# Patient Record
Sex: MALE | Race: WHITE
[De-identification: names, ages, dates, MRNs, and addresses within clinical notes are randomized per-mention and may not be internally consistent; named-entity substitution may affect disease eponyms.]

---

## 2016-05-20 VITALS
DIASTOLIC BLOOD PRESSURE: 74 MMHG | DIASTOLIC BLOOD PRESSURE: 74 MMHG | DIASTOLIC BLOOD PRESSURE: 74 MMHG | SYSTOLIC BLOOD PRESSURE: 147 MMHG | SYSTOLIC BLOOD PRESSURE: 147 MMHG | SYSTOLIC BLOOD PRESSURE: 147 MMHG

## 2017-03-20 ENCOUNTER — HOSPITAL ENCOUNTER (OUTPATIENT)
Dept: HOSPITAL 61 - SLPLAB | Age: 62
Discharge: HOME | End: 2017-03-20
Attending: INTERNAL MEDICINE
Payer: COMMERCIAL

## 2017-03-20 DIAGNOSIS — G47.33: Primary | ICD-10-CM

## 2017-03-20 PROCEDURE — 95810 POLYSOM 6/> YRS 4/> PARAM: CPT

## 2017-03-23 NOTE — SLEEP
DATE OF STUDY:  03/20/2017



ATTENDING PHYSICIAN:  Dr. Bill Cain.



The patient is a 61-year-old who weighs 230 pounds with a BMI of 32.  The

patient's [place] score was 9.  Sleep study was performed at [facility].  This was a split night study.



During the night study, the patient spent 399 minutes in bed and slept for 316

minutes with a sleep efficiency of 79%.  Sleep latency was 19 minutes with a REM

latency of 284 minutes.  Overall, sleep architecture showed normal stage I

sleep, slightly increased stage II sleep, normal slow wave, and normal REM

sleep.



During the initial diagnostic portion of the study, the patient slept for 158

minutes.  During this time, there was 1 obstructive apnea, 7 mixed and 1 central

apneas.  There were 71 hypopneas.  The patient's apnea-hypopnea index was 30 per

hour.  Supine index was 38 per hour.  REM sleep was not seen during the

diagnostic portion.



EKG monitoring revealed normal sinus rhythm.  Average heart rate was 67 beats

per minute.  No sustained arrhythmias were observed.



Nocturnal oximetry study revealed a mean oxygen saturation of 97% with the

lowest of 88%.



PLMS were seen at index of 4 per hour, and 1 per hour caused EEG arousals.



The patient met the criteria for CPAP initiation.  It was started at 5 cm of

water and titrated up to 7 cm of water.  At the final pressure, the patient had

80 minutes of sleep.  Supine as well as REM sleep was observed.  AHI was reduced

to 3 per hour, and oxygen saturation remained above 90%.  The patient used a

small-sized DreamWear nasal mask.



IMPRESSION:

1.  Severe sleep apnea-hypopnea syndrome with an apnea-hypopnea index of 30 per

hour.

2.  No clinically significant nocturnal hypoxia.

3.  No clinically significant periodic limb movements during sleep.



RECOMMENDATIONS:

1.  CPAP at 7 cm of water completely eliminated the patient's sleep apnea, and

should be used on a nightly basis.

2.  Follow up in 4-6 weeks to assess compliance with CPAP and to document

clinical improvement.

3.  Weight loss is strongly advised.

4.  Avoid CNS depressants.

5.  Caution regarding driving until symptoms of sleep apnea resolve with the use

of CPAP.

 



______________________________

JOY FELDMAN MD



DR:  MATT/arti  JOB#:  [id number] / [id number]

DD:  03/23/2017 15:20  DT:  03/23/2017 16:10



BILL Crump

## 2018-05-03 ENCOUNTER — HOSPITAL ENCOUNTER (OUTPATIENT)
Dept: HOSPITAL 61 - LAB | Age: 63
Discharge: HOME | End: 2018-05-03
Attending: INTERNAL MEDICINE
Payer: COMMERCIAL

## 2018-05-03 DIAGNOSIS — Z00.01: ICD-10-CM

## 2018-05-03 DIAGNOSIS — Z12.5: Primary | ICD-10-CM

## 2018-05-03 DIAGNOSIS — R79.89: ICD-10-CM

## 2018-05-03 LAB
ADD MAN DIFF?: NO
ALBUMIN SERPL-MCNC: 3.9 G/DL (ref 3.4–5)
ALBUMIN/GLOB SERPL: 1.1 {RATIO} (ref 1–1.7)
ALP SERPL-CCNC: 76 U/L (ref 46–116)
ALT (SGPT): 37 U/L (ref 16–63)
ANION GAP SERPL CALC-SCNC: 10 MMOL/L (ref 6–14)
AST SERPL-CCNC: 20 U/L (ref 15–37)
BACTERIA,URINE: 0 /HPF
BASO #: 0 X10^3/UL (ref 0–0.2)
BASO %: 1 % (ref 0–3)
BILIRUBIN,URINE: NEGATIVE
BLOOD UREA NITROGEN: 13 MG/DL (ref 8–26)
BUN/CREAT SERPL: 12 (ref 6–20)
CALCIUM: 8.9 MG/DL (ref 8.5–10.1)
CHLORIDE: 107 MMOL/L (ref 98–107)
CHOLESTEROL/HDL RATIO: 6.8
CHOLESTEROL: 196 MG/DL (ref 0–200)
CLARITY,URINE: CLEAR
CO2 SERPL-SCNC: 26 MMOL/L (ref 21–32)
COLOR,URINE: YELLOW
CREAT SERPL-MCNC: 1.1 MG/DL (ref 0.7–1.3)
EOS #: 0.4 X10^3/UL (ref 0–0.7)
EOS %: 7 % (ref 0–3)
GFR SERPLBLD BASED ON 1.73 SQ M-ARVRAT: 67.6 ML/MIN
GLOBULIN SER-MCNC: 3.7 G/DL (ref 2.2–3.8)
GLUCOSE SERPL-MCNC: 89 MG/DL (ref 70–99)
GLUCOSE,URINE: NEGATIVE MG/DL
HCG SERPL-ACNC: 6 X10^3/UL (ref 4–11)
HDLC: 29 MG/DL (ref 40–60)
HEMATOCRIT: 46.8 % (ref 39–53)
HEMOGLOBIN: 16.1 G/DL (ref 13–17.5)
LDLC: 138 MG/DL (ref 0–100)
LYMPH #: 2.8 X10^3/UL (ref 1–4.8)
LYMPH %: 47 % (ref 24–48)
MEAN CORPUSCULAR HEMOGLOBIN: 31 PG (ref 25–35)
MEAN CORPUSCULAR HGB CONC: 34 G/DL (ref 31–37)
MEAN CORPUSCULAR VOLUME: 90 FL (ref 79–100)
MONO #: 0.6 X10^3/UL (ref 0–1.1)
MONO %: 10 % (ref 0–9)
NEUT #: 2.1 X10^3UL (ref 1.8–7.7)
NEUT %: 35 % (ref 31–73)
NITRITE,URINE: NEGATIVE
NON-HDL CHOLESTEROL: 167 MG/DL (ref 0–129)
PH,URINE: 5.5
PLATELET COUNT: 163 X10^3/UL (ref 140–400)
POTASSIUM SERPL-SCNC: 4.3 MMOL/L (ref 3.5–5.1)
PROSTATE SPECIFIC ANTIGEN: 1.71 NG/ML (ref 0–4)
PROTEIN,URINE: NEGATIVE MG/DL
RBC,URINE: 0 /HPF (ref 0–2)
RED BLOOD COUNT: 5.19 X10^6/UL (ref 4.3–5.7)
RED CELL DISTRIBUTION WIDTH: 14 % (ref 11.5–14.5)
SODIUM: 143 MMOL/L (ref 136–145)
SPECIFIC GRAVITY,URINE: 1.01
SQUAMOUS EPITHELIAL CELL,UR: (no result) /LPF
THYROID STIM HORMONE (TSH): 2.74 UIU/ML (ref 0.36–3.74)
TOTAL BILIRUBIN: 0.4 MG/DL (ref 0.2–1)
TOTAL PROTEIN: 7.6 G/DL (ref 6.4–8.2)
TRIGLYCERIDES: 147 MG/DL (ref 0–150)
UROBILINOGEN,URINE: 0.2 MG/DL
VLDLC: 29 MG/DL (ref 0–40)
WBC,URINE: 0 /HPF (ref 0–4)

## 2018-05-03 PROCEDURE — 84443 ASSAY THYROID STIM HORMONE: CPT

## 2018-05-03 PROCEDURE — 80053 COMPREHEN METABOLIC PANEL: CPT

## 2018-05-03 PROCEDURE — 80061 LIPID PANEL: CPT

## 2018-05-03 PROCEDURE — 85025 COMPLETE CBC W/AUTO DIFF WBC: CPT

## 2018-05-03 PROCEDURE — 81001 URINALYSIS AUTO W/SCOPE: CPT

## 2018-05-03 PROCEDURE — 36415 COLL VENOUS BLD VENIPUNCTURE: CPT

## 2018-05-31 ENCOUNTER — HOSPITAL ENCOUNTER (OUTPATIENT)
Dept: HOSPITAL 61 - PNCL | Age: 63
End: 2018-05-31
Attending: ANESTHESIOLOGY
Payer: COMMERCIAL

## 2018-05-31 DIAGNOSIS — Z86.010: ICD-10-CM

## 2018-05-31 DIAGNOSIS — M50.10: ICD-10-CM

## 2018-05-31 DIAGNOSIS — F17.210: ICD-10-CM

## 2018-05-31 DIAGNOSIS — M51.16: Primary | ICD-10-CM

## 2018-05-31 DIAGNOSIS — Z98.890: ICD-10-CM

## 2018-05-31 DIAGNOSIS — Z87.39: ICD-10-CM

## 2018-05-31 PROCEDURE — 62323 NJX INTERLAMINAR LMBR/SAC: CPT

## 2018-07-18 ENCOUNTER — HOSPITAL ENCOUNTER (OUTPATIENT)
Dept: HOSPITAL 61 - MRI | Age: 63
End: 2018-07-18
Attending: ANESTHESIOLOGY
Payer: COMMERCIAL

## 2018-07-18 DIAGNOSIS — Z90.49: ICD-10-CM

## 2018-07-18 DIAGNOSIS — Z79.899: ICD-10-CM

## 2018-07-18 DIAGNOSIS — M19.90: ICD-10-CM

## 2018-07-18 DIAGNOSIS — Z98.890: ICD-10-CM

## 2018-07-18 DIAGNOSIS — M48.061: ICD-10-CM

## 2018-07-18 DIAGNOSIS — M50.10: ICD-10-CM

## 2018-07-18 DIAGNOSIS — Z86.010: ICD-10-CM

## 2018-07-18 DIAGNOSIS — Z87.891: ICD-10-CM

## 2018-07-18 DIAGNOSIS — G47.30: ICD-10-CM

## 2018-07-18 DIAGNOSIS — M51.26: ICD-10-CM

## 2018-07-18 DIAGNOSIS — M51.16: Primary | ICD-10-CM

## 2018-07-18 PROCEDURE — 72148 MRI LUMBAR SPINE W/O DYE: CPT

## 2018-07-19 ENCOUNTER — HOSPITAL ENCOUNTER (OUTPATIENT)
Dept: HOSPITAL 61 - PNCL | Age: 63
Discharge: HOME | End: 2018-07-19
Attending: ANESTHESIOLOGY
Payer: COMMERCIAL

## 2018-07-19 DIAGNOSIS — M96.1: ICD-10-CM

## 2018-07-19 DIAGNOSIS — M19.90: ICD-10-CM

## 2018-07-19 DIAGNOSIS — Z87.891: ICD-10-CM

## 2018-07-19 DIAGNOSIS — Z86.010: ICD-10-CM

## 2018-07-19 DIAGNOSIS — M51.16: Primary | ICD-10-CM

## 2018-07-19 DIAGNOSIS — Z86.19: ICD-10-CM

## 2018-07-19 DIAGNOSIS — Z98.890: ICD-10-CM

## 2018-07-19 DIAGNOSIS — Z79.899: ICD-10-CM

## 2018-07-19 DIAGNOSIS — M48.061: ICD-10-CM

## 2018-07-19 DIAGNOSIS — Z87.39: ICD-10-CM

## 2018-07-19 DIAGNOSIS — Z90.49: ICD-10-CM

## 2018-07-19 DIAGNOSIS — G47.30: ICD-10-CM

## 2018-07-19 DIAGNOSIS — M50.10: ICD-10-CM

## 2018-07-19 PROCEDURE — 62323 NJX INTERLAMINAR LMBR/SAC: CPT

## 2018-08-16 ENCOUNTER — HOSPITAL ENCOUNTER (OUTPATIENT)
Dept: HOSPITAL 61 - PNCL | Age: 63
Discharge: HOME | End: 2018-08-16
Attending: ANESTHESIOLOGY
Payer: COMMERCIAL

## 2018-08-16 DIAGNOSIS — Z98.890: ICD-10-CM

## 2018-08-16 DIAGNOSIS — Z86.19: ICD-10-CM

## 2018-08-16 DIAGNOSIS — M19.90: ICD-10-CM

## 2018-08-16 DIAGNOSIS — G47.30: ICD-10-CM

## 2018-08-16 DIAGNOSIS — M48.061: ICD-10-CM

## 2018-08-16 DIAGNOSIS — Z90.49: ICD-10-CM

## 2018-08-16 DIAGNOSIS — Z79.899: ICD-10-CM

## 2018-08-16 DIAGNOSIS — M96.1: ICD-10-CM

## 2018-08-16 DIAGNOSIS — Z86.010: ICD-10-CM

## 2018-08-16 DIAGNOSIS — M51.16: Primary | ICD-10-CM

## 2018-08-16 PROCEDURE — 62323 NJX INTERLAMINAR LMBR/SAC: CPT

## 2018-08-16 NOTE — PAIN
DATE OF SERVICE:  08/16/2018



DIAGNOSES:  Lumbar radiculopathy with lumbar degenerative disk disease and

lumbar post-laminectomy syndrome with lumbar herniated disk.



HISTORY OF PRESENT ILLNESS:  The patient is a 62-year-old male who returns for

followup status post lumbar epidural steroid injection x 2, last seen

07/19/2018.  The patient reports he did very well for the last injection about

60% improvement overall in the low back and right leg.  The patient reports

still some pain in the left leg and numbness in the left leg as well, but doing

much better overall.  The patient reports his pain is worse with standing or

standing up straight, but he has been increasing the distance walking, doing

household activity, also playing drums more comfortably.  The patient reports

his pain is 7 on a scale of 10 at its worst, 4 at average, 3 at its least and is

a 4 today.  The patient reports it is sharp, tight, burning, radiating in the

lower extremities, again some numbness in the left leg, but much better on the

right.  The patient reports no new motor or sensory deficits, no new bowel or

bladder incontinence or other complaints.



PHYSICAL EXAMINATION:

VITAL SIGNS:  The patient's blood pressure 151/52, pulse 69, respirations 16,

temperature 98.2 degrees Fahrenheit.  Height is 5 feet 11 inches, weight is 232

pounds.

GENERAL:  The patient is awake, alert, oriented, appropriate, very pleasant

demeanor.

HEENT:  Head shows normocephalic, atraumatic.  Extraocular movements are intact,

symmetrical.  Oral cavity, mucous membranes are moist and pink.  Dentition is

intact.

NECK:  Shows anterior throat supple without palpable lymphadenopathy noted. 

Swallow reflex is symmetrical.

CHEST:  Shows normal on inspection.  Breath sounds clear to auscultation

bilaterally.

HEART:  Shows S1, S2 clear.  No murmurs auscultated.

ABDOMEN:  Soft, nontender, nondistended.  No palpable organomegaly is noted.  No

rebound or guarding demonstrated.

BACK:  Shows spine grossly in the midline with some flattening of lumbar

lordotic curvature with well-healed surgical scar and the lumbar paraspinous

musculature shows symmetrical on inspection with some palpation shows some

moderate tenderness throughout the upper, middle and lower distribution of the

paraspinous muscles, but only diffusely in the paraspinous regions without

radiation.  No tenderness over the spinous processes, sacrum or sacroiliac

regions.  The patient has good rotational motion of lumbar spine, both laterally

greater than 10 degrees right and left as well as extension greater than 10

degrees, forward flexion 45 degrees without significant pain reported.

EXTREMITIES:  Lower extremities show deep tendon reflexes at 2+ in the patellar,

1+ tendo calcaneus tendons.  Motor exam is strong with 5/5 dorsiflexion,

extension, quadriceps and hamstring flexion equal bilaterally.



Options were discussed with the patient.  The patient's old chart was reviewed

as his current medication regimen and updated.  Current review of systems is

updated today as well.  We will proceed with a third in the series of lumbar

epidural steroid injection today with fluoroscopic guidance.  Risks were again

discussed including, but not limited to bleeding, infection, possibility of

epidural hematoma and subsequent neurological compromise, dural puncture,

headaches, spinal cord and/or nerve damage, side effects of steroid medication

and poor results regarding pain control.  The patient understands and wished to

proceed.  The patient will return to the clinic in approximately 2 weeks for

followup, was counseled on return appointment, activity level and side effects

to be aware of.



DIAGNOSES:  Lumbar radiculopathy with lumbar degenerative disk disease,

post-lumbar laminectomy syndrome and herniated lumbar disk.



PROCEDURE:  Lumbar epidural steroid injection, translaminar approach L3-L4 level

using C-arm fluoroscopic guidance under sterile prep and drape using local

anesthetic.



MEDICATION INJECTED:  A total of 120 mg Depo-Medrol plus 10 mL of

preservative-free normal saline and 2 mL of Isovue for contrast.



CONDITION AT DISCHARGE:  Stable.  The patient tolerated procedure well, had no

complications.

 



______________________________

XIANG BARON MD



DR:  KETAN/arti  JOB#:  [id number] / [id number]

DD:  08/16/2018 08:23  DT:  08/16/2018 13:39

## 2018-12-27 ENCOUNTER — HOSPITAL ENCOUNTER (OUTPATIENT)
Dept: HOSPITAL 61 - PNCL | Age: 63
Discharge: HOME | End: 2018-12-27
Attending: ANESTHESIOLOGY
Payer: COMMERCIAL

## 2018-12-27 DIAGNOSIS — M51.16: Primary | ICD-10-CM

## 2018-12-27 DIAGNOSIS — M96.1: ICD-10-CM

## 2018-12-27 PROCEDURE — 62323 NJX INTERLAMINAR LMBR/SAC: CPT

## 2018-12-27 NOTE — PAIN
DATE OF SERVICE:  12/27/2018



PROGRESS NOTE FOR PAIN CLINIC:



DIAGNOSES:  Lumbar radiculopathy with lumbar degenerative disk disease and

lumbar post-laminectomy syndrome and lumbar herniated disk.



HISTORY OF PRESENT ILLNESS:  The patient is a 63-year-old male who returns for

followup, seen 08/16/2018.  The patient had a lumbar epidural steroid injection

at that time with about 60% improvement.  Pain in the low back and right leg now

returning.  For about 2 months, the patient reports in the low back radiating to

the right lower extremity, right posterolateral thigh, anterior thigh, anterior

medial thigh, medial lower leg into the knee as well.  The patient reports it is

across the low back on the right side.  The patient reports it is an 8 on a

scale of 10 at its worst, 5 on average and 3 at its least and is a 5 today,

reports as tingling, sharp, sometimes cold and hot sensations in the leg as

well, certain bending and stooping activities.  The patient reports initially

was increasing his activity walking, doing work activities, household activities

with much greater ease and comfort and traveling with much greater ease and

comfort as well.  The patient reports he still sleeps fairly well at night, but

it can awaken him from sleep, not every night but usually about every 7 hours to

8 hours.  The patient reports no new motor or sensory deficits, no new bowel or

bladder incontinence, but still significant pain, increasing again over the past

month or so in the low back and right leg.



PHYSICAL EXAMINATION:

VITAL SIGNS:  The patient's blood pressure 141/87, pulse 75, respirations are

18, temperature 97.8 degrees Fahrenheit, height is 5 feet 11 inches, weighs 239

pounds.

GENERAL:  The patient is awake, alert, oriented, appropriate, very pleasant

demeanor.

HEENT:  Head is normocephalic, atraumatic.  Extraocular movements are intact and

symmetrical.  Oral cavity, mucous membranes are moist and pink.  Dentition is

intact.

NECK:  Shows anterior throat supple without palpable lymphadenopathy noted. 

Swallow reflex symmetrical.

CHEST:  Shows normal with inspection.  Breath sounds clear to auscultation

bilaterally.

HEART:  Shows S1, S2 clear.  No murmurs auscultated.

ABDOMEN:  Soft, nontender, nondistended.  No palpable organomegaly is noted.  No

rebound or guarding demonstrated.

BACK:  Shows spine grossly in the midline.  Normal appearing thoracic kyphosis

and lumbar lordotic curvature slightly flattened with well-healed surgical

scarring of the lumbar distribution.  Lumbar paraspinous muscle shows

symmetrical on inspection, with palpation shows some moderate tenderness

diffusely throughout the upper, middle and lower distribution of paraspinous

muscles, but only diffusely without radiation.  The patient shows good

rotational motion of lumbar spine with extension, flexion, right and left

lateral rotation without significant increase in pain.

EXTREMITIES:  Lower extremities show deep tendon reflexes at 2+ in the patellar,

1+ tendo-calcaneus tendons are equal.  Motor exam is strong with 5/5

dorsiflexion, extension, quadriceps and hamstring flexion.  Peripheral pulses

are 1+ posterior tibial.  No peripheral edema is noted bilaterally.



Options were discussed with the patient.  The patient's old chart was reviewed

as well as his current medication regimen updated.  Current review of systems

updated today as well.  We will proceed with a first in this series of lumbar

epidural steroid injection today with fluoroscopic guidance.  Risks were again

discussed including, but not limited to bleeding, infection, possibility of

epidural hematoma, subsequent neurologic compromise, dural puncture, headaches,

spinal cord and/or nerve damage, side effects of steroid medication and poor

results regarding pain control.  The patient understands and wished to proceed. 

The patient will return to clinic in approximately 2 weeks for followup, was

counseled as to return appointment, activity level and side effects to be aware

of.



DIAGNOSES:  Lumbar radiculopathy with lumbar degenerative disk disease,

post-laminectomy syndrome and lumbar herniated disk.



PROCEDURE:  Lumbar epidural steroid injection, translaminar approach at L3-L4

level using C-arm fluoroscopic guidance under sterile prep and drape using local

anesthetic.



MEDICATION INJECTED:  A total of 120 mg Depo-Medrol plus 10 mL of

preservative-free normal saline and 2 mL of Isovue for contrast.



CONDITION AT DISCHARGE:  Stable.  The patient tolerated the procedure well, had

no complications.

 



______________________________

XIANG BARON MD



DR:  KETAN/arti  JOB#:  [id number] / [id number]

DD:  12/27/2018 10:26  DT:  12/27/2018 22:39

## 2019-02-18 NOTE — [facility]
Lumbar myelogram February 18, 2019

Lumbar spine radiograph

CT lumbar spine with contrast

 

INDICATION: Lower back pain radiating down bilateral legs with numbness. 

History of prior lumbar surgery.

 

COMPARISON: MRI lumbar spine November 10, 2004

 

TECHNIQUE:

 

5 views of the lumbosacral spine were obtained including flexion and 

extension views.

 

Risks and benefits were discussed with the patient after which informed 

consent was obtained. Patient was placed prone on the examination table. 

Site was marked under fluoroscopic guidance. This site was prepped and 

draped in normal sterile fashion. 1 percent lidocaine was administered for

superficial anesthetic effect. Initially, attempt was made to insert a 

22-gauge 3 1/2 inch spinal needle at the L2-L3 vertebral level utilizing a

left paraspinous approach which was unsuccessful. Subsequently, a 22-gauge

3 1/2 inch spinal needle was inserted successfully into the thecal sac at 

L4-L5 vertebral level. 18 cc Omnipaque 180 was administered into the 

thecal sac. No competitions were identified at that time of procedure. 

Patient tolerated procedure well.

 

Patient was subsequently transported to the CT scanner for further 

imaging. Multiple axial CT images of the lumbar spine were obtained 

without intrathecal contrast. Coronal and sagittal reformats are provided.

 

FINDINGS:

 

Left-sided L4-L5 posterior fusion is identified with unilateral left-sided

facet screws. There is no shift in alignment at L4-L5 upon flexion and 

extension. No significant spondylolisthesis is visualized.

 

Unilateral left-sided fusion is identified at L4-L5. There is no lucency 

surrounding the hardware. Hardware appears intact. There is moderate disc 

height loss at L4-L5. There is mild disc height loss at L5-S1. There is no

significant spondylolisthesis. Vertebral body heights are maintained. No 

acute fracture is identified. Laminectomy changes are identified at L4-L5.

Abdominal aorta is normal in caliber with mild calcified atheromatous 

plaque. No suspicious retroperitoneal lymphadenopathy is identified. 

Visualized portions of the sacrum appear intact. The nerve roots within 

the thecal sac from L2-L3 inferiorly appears thickened with a mild degree 

of clumping which may reflect chronic inflammation as may be seen with 

arachnoiditis. This finding is new from the prior MRI from 2004.

 

L1-L2: There is a mild circumferential disc bulge. There is no significant

facet arthropathy. There is no significant neuroforaminal or spinal canal 

stenosis.

 

L2-L3: There is a large circumferential disc bulge. There is moderate left

and severe right facet arthropathy. There is ligamentum flavum infolding. 

There is moderate bilateral neuroforaminal stenosis. There is severe 

spinal canal stenosis with near complete effacement of the thecal sac. 

Residual thecal sac measures approximately 4 mm in AP dimension. Findings 

are significantly progressed since November 10, 2004.

 

L3-L4: There is a moderate disc bulge with left far lateral disc 

protrusion. There is severe left and moderate right facet arthropathy 

ligamentum flavum infolding. There is moderate right and severe left 

neuroforaminal stenosis. There is left lateral recess stenosis. There is 

moderate spinal canal stenosis. Findings have progressed since the prior 

examination.

 

L4-L5: This level is decompressed partially fused. There is a posterior 

disc osteophyte complex. There is severe left and moderate right 

neuroforaminal stenosis. No spinal canal stenosis.

 

L5-S1: There is mild disc bulge. There is severe facet arthropathy. There 

is severe bilateral neuroforaminal stenosis, progressed since prior 

examination.

 

IMPRESSION:

 

Moderate to advanced degenerative changes of the lumbar spine, as 

described in detail above. Findings have significantly progressed at L2-L3

and L3-L4. Left unilateral posterior fusion at L4-L5 is noted without 

evidence for hardware failure.

 

 

 

Electronically signed by: Lizeth Rodriguez MD (2/18/2019 12:52 PM) 

[facility]

## 2019-02-18 NOTE — [facility]
Lumbar myelogram February 18, 2019

Lumbar spine radiograph

CT lumbar spine with contrast

 

INDICATION: Lower back pain radiating down bilateral legs with numbness. 

History of prior lumbar surgery.

 

COMPARISON: MRI lumbar spine November 10, 2004

 

TECHNIQUE:

 

5 views of the lumbosacral spine were obtained including flexion and 

extension views.

 

Risks and benefits were discussed with the patient after which informed 

consent was obtained. Patient was placed prone on the examination table. 

Site was marked under fluoroscopic guidance. This site was prepped and 

draped in normal sterile fashion. 1 percent lidocaine was administered for

superficial anesthetic effect. Initially, attempt was made to insert a 

22-gauge 3 1/2 inch spinal needle at the L2-L3 vertebral level utilizing a

left paraspinous approach which was unsuccessful. Subsequently, a 22-gauge

3 1/2 inch spinal needle was inserted successfully into the thecal sac at 

L4-L5 vertebral level. 18 cc Omnipaque 180 was administered into the 

thecal sac. No competitions were identified at that time of procedure. 

Patient tolerated procedure well.

 

Patient was subsequently transported to the CT scanner for further 

imaging. Multiple axial CT images of the lumbar spine were obtained 

without intrathecal contrast. Coronal and sagittal reformats are provided.

 

FINDINGS:

 

Left-sided L4-L5 posterior fusion is identified with unilateral left-sided

facet screws. There is no shift in alignment at L4-L5 upon flexion and 

extension. No significant spondylolisthesis is visualized.

 

Unilateral left-sided fusion is identified at L4-L5. There is no lucency 

surrounding the hardware. Hardware appears intact. There is moderate disc 

height loss at L4-L5. There is mild disc height loss at L5-S1. There is no

significant spondylolisthesis. Vertebral body heights are maintained. No 

acute fracture is identified. Laminectomy changes are identified at L4-L5.

Abdominal aorta is normal in caliber with mild calcified atheromatous 

plaque. No suspicious retroperitoneal lymphadenopathy is identified. 

Visualized portions of the sacrum appear intact. The nerve roots within 

the thecal sac from L2-L3 inferiorly appears thickened with a mild degree 

of clumping which may reflect chronic inflammation as may be seen with 

arachnoiditis. This finding is new from the prior MRI from 2004.

 

L1-L2: There is a mild circumferential disc bulge. There is no significant

facet arthropathy. There is no significant neuroforaminal or spinal canal 

stenosis.

 

L2-L3: There is a large circumferential disc bulge. There is moderate left

and severe right facet arthropathy. There is ligamentum flavum infolding. 

There is moderate bilateral neuroforaminal stenosis. There is severe 

spinal canal stenosis with near complete effacement of the thecal sac. 

Residual thecal sac measures approximately 4 mm in AP dimension. Findings 

are significantly progressed since November 10, 2004.

 

L3-L4: There is a moderate disc bulge with left far lateral disc 

protrusion. There is severe left and moderate right facet arthropathy 

ligamentum flavum infolding. There is moderate right and severe left 

neuroforaminal stenosis. There is left lateral recess stenosis. There is 

moderate spinal canal stenosis. Findings have progressed since the prior 

examination.

 

L4-L5: This level is decompressed partially fused. There is a posterior 

disc osteophyte complex. There is severe left and moderate right 

neuroforaminal stenosis. No spinal canal stenosis.

 

L5-S1: There is mild disc bulge. There is severe facet arthropathy. There 

is severe bilateral neuroforaminal stenosis, progressed since prior 

examination.

 

IMPRESSION:

 

Moderate to advanced degenerative changes of the lumbar spine, as 

described in detail above. Findings have significantly progressed at L2-L3

and L3-L4. Left unilateral posterior fusion at L4-L5 is noted without 

evidence for hardware failure.

 

 

 

Electronically signed by: Lizeth Rodriguez MD (2/18/2019 12:52 PM) 

[facility]

## 2019-02-18 NOTE — [facility]
Lumbar myelogram February 18, 2019

Lumbar spine radiograph

CT lumbar spine with contrast

 

INDICATION: Lower back pain radiating down bilateral legs with numbness. 

History of prior lumbar surgery.

 

COMPARISON: MRI lumbar spine November 10, 2004

 

TECHNIQUE:

 

5 views of the lumbosacral spine were obtained including flexion and 

extension views.

 

Risks and benefits were discussed with the patient after which informed 

consent was obtained. Patient was placed prone on the examination table. 

Site was marked under fluoroscopic guidance. This site was prepped and 

draped in normal sterile fashion. 1 percent lidocaine was administered for

superficial anesthetic effect. Initially, attempt was made to insert a 

22-gauge 3 1/2 inch spinal needle at the L2-L3 vertebral level utilizing a

left paraspinous approach which was unsuccessful. Subsequently, a 22-gauge

3 1/2 inch spinal needle was inserted successfully into the thecal sac at 

L4-L5 vertebral level. 18 cc Omnipaque 180 was administered into the 

thecal sac. No competitions were identified at that time of procedure. 

Patient tolerated procedure well.

 

Patient was subsequently transported to the CT scanner for further 

imaging. Multiple axial CT images of the lumbar spine were obtained 

without intrathecal contrast. Coronal and sagittal reformats are provided.

 

FINDINGS:

 

Left-sided L4-L5 posterior fusion is identified with unilateral left-sided

facet screws. There is no shift in alignment at L4-L5 upon flexion and 

extension. No significant spondylolisthesis is visualized.

 

Unilateral left-sided fusion is identified at L4-L5. There is no lucency 

surrounding the hardware. Hardware appears intact. There is moderate disc 

height loss at L4-L5. There is mild disc height loss at L5-S1. There is no

significant spondylolisthesis. Vertebral body heights are maintained. No 

acute fracture is identified. Laminectomy changes are identified at L4-L5.

Abdominal aorta is normal in caliber with mild calcified atheromatous 

plaque. No suspicious retroperitoneal lymphadenopathy is identified. 

Visualized portions of the sacrum appear intact. The nerve roots within 

the thecal sac from L2-L3 inferiorly appears thickened with a mild degree 

of clumping which may reflect chronic inflammation as may be seen with 

arachnoiditis. This finding is new from the prior MRI from 2004.

 

L1-L2: There is a mild circumferential disc bulge. There is no significant

facet arthropathy. There is no significant neuroforaminal or spinal canal 

stenosis.

 

L2-L3: There is a large circumferential disc bulge. There is moderate left

and severe right facet arthropathy. There is ligamentum flavum infolding. 

There is moderate bilateral neuroforaminal stenosis. There is severe 

spinal canal stenosis with near complete effacement of the thecal sac. 

Residual thecal sac measures approximately 4 mm in AP dimension. Findings 

are significantly progressed since November 10, 2004.

 

L3-L4: There is a moderate disc bulge with left far lateral disc 

protrusion. There is severe left and moderate right facet arthropathy 

ligamentum flavum infolding. There is moderate right and severe left 

neuroforaminal stenosis. There is left lateral recess stenosis. There is 

moderate spinal canal stenosis. Findings have progressed since the prior 

examination.

 

L4-L5: This level is decompressed partially fused. There is a posterior 

disc osteophyte complex. There is severe left and moderate right 

neuroforaminal stenosis. No spinal canal stenosis.

 

L5-S1: There is mild disc bulge. There is severe facet arthropathy. There 

is severe bilateral neuroforaminal stenosis, progressed since prior 

examination.

 

IMPRESSION:

 

Moderate to advanced degenerative changes of the lumbar spine, as 

described in detail above. Findings have significantly progressed at L2-L3

and L3-L4. Left unilateral posterior fusion at L4-L5 is noted without 

evidence for hardware failure.

 

 

 

Electronically signed by: Lizeth Rodriguez MD (2/18/2019 12:52 PM) 

[facility]

## 2019-03-19 NOTE — EKG
[facility]

              [address] [place] [address]

Test Date:    2019               Test Time:    14:08:24

Pat Name:     KENNEDI CABRERA                Department:   

Patient ID:   [id number]           Room:          

Gender:       Male                     Technician:   

:          1955               Requested By: BRAYAN CAT

Order Number: [id number]           Reading MD:   Raúl Forbes

                                 Measurements

Intervals                              Axis          

Rate:         79                       P:            31

CA:           176                      QRS:          -7

QRSD:         86                       T:            21

QT:           390                                    

QTc:          448                                    

                           Interpretive Statements

SINUS RHYTHM

INTERPOLATED ATRIAL PREMATURE COMPLEX(ES)

NONSPECIFIC ST-T WAVE CHANGES.

LEFTWARD AXIS





Electronically Signed On 3- 11:27:51 CDT by Raúl Forbes

## 2019-03-19 NOTE — RAD
AP and Lateral Views of the Chest  3/19/2019 1:41 PM

 

Indication: PRE OP, PT TO HAVE LAMINECTOMY 3/26

 

Comparison: Chest radiograph June 4, 2009

 

Findings: There is no focal consolidation or infiltrate identified. The 

cardiomediastinal silhouette is within normal limits. There is no evidence

of pneumothorax or pleural effusion. No acute osseous abnormalities are 

identified.  Postoperative changes to the cervical spine noted.

 

Impression: No evidence of acute cardiopulmonary process. 

 

Electronically signed by: Fareed Eric MD (3/19/2019 3:53 PM) [id number]

## 2019-03-26 NOTE — PDOC
BRIEF OPERATIVE NOTE


Date:  Mar 26, 2019


Pre-Op Diagnosis


lumbar radiculopathy, lumbar stenosis, lumbar spondylosis, back pain


Post-Op Diagnosis


same


Procedure Performed


bilateral laminectomies of L2-3 and L3-4, neuromonitoring


Surgeon


Willam


Assistant


Patt


Anesthesiologist


Prachi


Anesthesia Type:  General


Blood Loss


25mL


Specimens Obtained


decompression


Findings


severe stenosis L2-3 and L3-4, neuromonitoring remained at least baseline 

throughout the procedure and was less adversely sensitive to manipulation upon 

completion of decompression


Complications


none apparent











BRAYAN CAT MD Mar 26, 2019 11:27

## 2019-03-26 NOTE — NUR
Arrived to unit by bed from PACU. Alert and oriented x's 4. No c/o at this time. Ambulated 
to bathroom  with assist x's 1 voided and return to bed. NISSA's and MICHAEL's on bilaterally. 
IVF's intact and infusing. Pedal pulses + bilaterally, warm touch and no c/o numbness. 
Oriented to room and controls. Side rails up x's 2 with call light in reach. Wife at 
bedside. Cont. monitor.

## 2019-03-27 NOTE — NUR
Quentin is doing well. he is ambulating in room. states the pain in his left leg is almost gone 
and numbness in toes is better. has good strength pulses and sensation bilateral lower 
extremities. dressing (reinforced ) is clean dry and intact.  reviewed orally restrictions 
to activities of daily living.

## 2019-03-27 NOTE — PDOC
PROGRESS NOTES


Subjective


Subjective


Reports resolution of leg pain and numbness. Some incisional pain controlled by 

current regimen. Has been ambulating around the unit without problems.





Objective


Objective





Vital Signs








  Date Time  Temp Pulse Resp B/P (MAP) Pulse Ox O2 Delivery O2 Flow Rate FiO2


 


3/27/19 08:25   20     


 


3/27/19 06:33 98.2 70  154/85 (108) 96 Room Air  





 98.2       


 


3/26/19 12:15       8.0 














Intake and Output 


 


 3/27/19





 07:00


 


Intake Total 2090 ml


 


Output Total 25 ml


 


Balance 2065 ml


 


 


 


Intake Oral 1140 ml


 


IV Total 950 ml


 


Output Estimated Blood Loss 25 ml


 


# Voids 5











Physical Exam


Physical Exam


AAOx4, NAD, dressing c/d/i, GUTIERREZ 5/5, sensation intact LT





Assessment


Assessment


POD 1 L2-3, L3-4 bilateral laminectomy


-neurologically intact with significant improvement of preoperative leg symptoms





Plan


Plan of Care


-continue to mobilize with standard post-op restrictions


-d/c home today


-follow-up with NS/Mckenziemp in two weeks





Comment


Review of Relevant


I have reviewed the following items lydia (where applicable) has been applied.


Medications





Current Medications


Bacitracin 19832 unit/Sodium Chloride 1,000 ml @  1,000 mls/hr 1X  ONCE IRR  

Last administered on 3/26/19at 08:30;  Start 3/26/19 at 06:00;  Stop 3/26/19 at 

06:59;  Status DC


Ondansetron HCl (Zofran) 4 mg PRN Q6HRS  PRN IV NAUSEA/VOMITING;  Start 3/26/19 

at 07:00;  Stop 3/26/19 at 16:00;  Status DC


Fentanyl Citrate (Fentanyl 2ml Vial) 25 mcg PRN Q5MIN  PRN IV MILD PAIN;  Start 

3/26/19 at 07:00;  Stop 3/26/19 at 16:00;  Status DC


Fentanyl Citrate (Fentanyl 2ml Vial) 50 mcg PRN Q5MIN  PRN IV MODERATE TO 

SEVERE PAIN Last administered on 3/26/19at 12:54;  Start 3/26/19 at 07:00;  

Stop 3/26/19 at 16:00;  Status DC


Morphine Sulfate (Morphine Sulfate) 1 mg PRN Q10MIN  PRN IV SEVERE PAIN Last 

administered on 3/26/19at 14:19;  Start 3/26/19 at 07:00;  Stop 3/26/19 at 16:00

;  Status DC


Ringer's Solution 1,000 ml @  30 mls/hr Q24H IV  Last administered on 3/26/19at 

07:57;  Start 3/26/19 at 07:00;  Stop 3/26/19 at 18:59;  Status DC


Hydromorphone HCl (Dilaudid) 0.5 mg PRN Q10MIN  PRN IV SEV PAIN, Second choice;

  Start 3/26/19 at 07:00;  Stop 3/26/19 at 16:00;  Status DC


Prochlorperazine Edisylate (Compazine) 5 mg PACU PRN  PRN IV NAUSEA, MRX1;  

Start 3/26/19 at 07:00;  Stop 3/26/19 at 16:00;  Status DC


Cefazolin Sodium/ Dextrose 50 ml @  100 mls/hr 1X PREOP  PRN IV PRIOR TO 

PROCEDURE Last administered on 3/26/19at 08:35;  Start 3/26/19 at 06:00;  Stop 3

/26/19 at 15:47;  Status DC


Gelatin (Gelfoam  Size 100) 1 each STK-MED ONCE .ROUTE ;  Start 3/26/19 at 05:59

;  Stop 3/26/19 at 07:00;  Status DC


Bupivacaine HCl (Sensorcaine Mpf 0.5%) 30 ml STK-MED ONCE .ROUTE  Last 

administered on 3/26/19at 09:56;  Start 3/26/19 at 05:59;  Stop 3/26/19 at 07:00

;  Status DC


Lidocaine/ Epinephrine (LIDOCAINE 1%-EPI 1:100,000 Multi-Dose) 20 ml STK-MED 

ONCE .ROUTE  Last administered on 3/26/19at 09:25;  Start 3/26/19 at 06:00;  

Stop 3/26/19 at 07:00;  Status DC


Thrombin 20,000 unit STK-MED ONCE TP ;  Start 3/26/19 at 06:00;  Stop 3/26/19 

at 07:00;  Status DC


Propofol 20 ml @ As Directed STK-MED ONCE IV ;  Start 3/26/19 at 07:39;  Stop 3/

26/19 at 07:40;  Status DC


Dexamethasone Sodium Phosphate (Decadron) 20 mg STK-MED ONCE .ROUTE ;  Start 3/

26/19 at 07:39;  Stop 3/26/19 at 07:40;  Status DC


Lidocaine HCl (Lidocaine Pf 2% Vial) 5 ml STK-MED ONCE .ROUTE ;  Start 3/26/19 

at 07:39;  Stop 3/26/19 at 07:40;  Status DC


Ondansetron HCl (Zofran) 4 mg STK-MED ONCE .ROUTE ;  Start 3/26/19 at 07:39;  

Stop 3/26/19 at 07:40;  Status DC


Propofol 0 ml @ As Directed STK-MED ONCE IV ;  Start 3/26/19 at 07:39;  Stop 3/

26/19 at 07:40;  Status DC


Rocuronium Bromide (Zemuron) 50 mg STK-MED ONCE .ROUTE ;  Start 3/26/19 at 07:39

;  Stop 3/26/19 at 07:40;  Status DC


Remifentanil HCl (Ultiva) 2 mg STK-MED ONCE IV ;  Start 3/26/19 at 07:40;  Stop 

3/26/19 at 07:41;  Status DC


Midazolam HCl (Versed) 2 mg STK-MED ONCE .ROUTE ;  Start 3/26/19 at 07:40;  

Stop 3/26/19 at 07:41;  Status DC


Ringer's Solution 1,000 ml @  75 mls/hr [id number] IV ;  Start 3/26/19 at 08:00


Propofol 100 ml @  As Directed STK-MED ONCE IV ;  Start 3/26/19 at 07:12;  Stop 

3/26/19 at 08:13;  Status DC


Ephedrine Sulfate (Akovaz) 50 mg STK-MED ONCE .ROUTE ;  Start 3/26/19 at 09:17;

  Stop 3/26/19 at 09:18;  Status DC


Glycopyrrolate (Robinul) 1 mg STK-MED ONCE .ROUTE ;  Start 3/26/19 at 09:17;  

Stop 3/26/19 at 09:18;  Status DC


Phenylephrine HCl (Raymond-Synephrine Inj) 10 mg STK-MED ONCE .ROUTE ;  Start 3/26/

19 at 09:18;  Stop 3/26/19 at 09:19;  Status DC


Desflurane (Suprane) 90 ml STK-MED ONCE IH ;  Start 3/26/19 at 11:13;  Stop 3/26

/19 at 11:14;  Status DC


Neostigmine Methylsulfate (Neostigmine Methylsulfate) 5 mg STK-MED ONCE .ROUTE 

;  Start 3/26/19 at 11:13;  Stop 3/26/19 at 11:14;  Status DC


Fentanyl Citrate (Fentanyl 2ml Vial) 100 mcg STK-MED ONCE .ROUTE ;  Start 3/26/

19 at 11:17;  Stop 3/26/19 at 11:18;  Status DC


Amitriptyline HCl (Elavil) 25 mg QHS PO  Last administered on 3/26/19at 21:17;  

Start 3/26/19 at 21:00


Vitamin D (Vitamin D3) 5,000 unit DAILY PO ;  Start 3/26/19 at 13:00


Gabapentin (Neurontin) 800 mg TID PO  Last administered on 3/27/19at 08:23;  

Start 3/26/19 at 14:00


Non-Formulary Medication (Melatonin ) 10 mg HS PO ;  Start 3/26/19 at 21:00;  

Status UNV


Sumatriptan Succinate (Imitrex) 100 mg PRN Q2HR  PRN PO MIGRAINE HEADACHE;  

Start 3/26/19 at 12:15


Multivitamins (Thera M Plus) 1 tab DAILY PO ;  Start 3/26/19 at 13:00


Calcium/Vitamin D (Oscal D 500mg/ 200uts) 1 tab BIDWMEALS PO  Last administered 

on 3/27/19at 08:00;  Start 3/26/19 at 17:00


Ferrous Sulfate (Feosol) 325 mg BIDWMEALS PO  Last administered on 3/26/19at 16:

54;  Start 3/26/19 at 17:00


Oxycodone HCl (Roxicodone) 5 mg PRN Q3HRS  PRN PO BREAKTHROUGH PAIN Last 

administered on 3/26/19at 21:31;  Start 3/26/19 at 11:45


Acetaminophen (Tylenol) 650 mg PRN Q6HRS  PRN PO MILD PAIN / TEMP;  Start 3/26/

19 at 11:45


Al Hydroxide/Mg Hydroxide (Mylanta Plus Xs) 30 ml PRN Q3HRS  PRN PO HEARTBURN / 

GAS;  Start 3/26/19 at 11:45


Calcium Carbonate/ Glycine (Tums) 500 mg PRN Q3HRS  PRN PO INDIGESTION;  Start 3

/26/19 at 11:45


Diphenhydramine HCl (Benadryl) 25 mg PRN Q6HRS  PRN PO ITCHING;  Start 3/26/19 

at 11:45


Diphenhydramine HCl (Benadryl) 25 mg PRN Q6HRS  PRN IV ITCHING;  Start 3/26/19 

at 11:45


Zolpidem Tartrate (Ambien) 5 mg PRN QHS  PRN PO INSOMNIA, MAY REPEAT IN 1HR;  

Start 3/26/19 at 11:45


Naloxone HCl (Narcan) 0.1 mg PRN Q2MIN  PRN IV ADMIN;  Start 3/26/19 at 11:45


Sodium Chloride (Normal Saline Flush) 3 ml QSHIFT  PRN IV AFTER MEDS AND BLOOD 

DRAWS;  Start 3/26/19 at 11:45


Oxycodone/ Acetaminophen (Percocet 5/325) 1 tab PRN Q4HRS  PRN PO MILD PAIN, 

1ST CHOICE Last administered on 3/26/19at 18:13;  Start 3/26/19 at 11:45


Oxycodone/ Acetaminophen (Percocet 5/325) 2 tab PRN Q4HRS  PRN PO MODERATE PAIN

, SEVERE PAIN Last administered on 3/27/19at 08:25;  Start 3/26/19 at 11:45


Methocarbamol (Robaxin) 750 mg TID PO  Last administered on 3/27/19at 08:22;  

Start 3/26/19 at 14:00


Senna/Docusate Sodium (Senna Plus) 1 tab BID PO  Last administered on 3/27/19at 

08:22;  Start 3/26/19 at 21:00


Docusate Sodium (Colace) 100 mg BID PO  Last administered on 3/27/19at 08:22;  

Start 3/26/19 at 21:00


Magnesium Hydroxide (Milk Of Magnesia) 2,400 mg PRN Q12HR  PRN PO CONSTIPATION;

  Start 3/26/19 at 11:45


Ondansetron HCl (Zofran) 4 mg PRN Q6HRS  PRN IV NAUESA, 1ST CHOICE;  Start 3/26/

19 at 11:45


Fentanyl Citrate (Fentanyl 2ml Vial) 50 mcg PRN Q1HR  PRN IV SEVERE PAIN;  

Start 3/26/19 at 11:45


Fentanyl Citrate (Fentanyl 2ml Vial) 25 mcg PRN Q1HR  PRN IV SEVERE PAIN;  

Start 3/26/19 at 11:45


Fentanyl Citrate (Fentanyl 2ml Vial) 100 mcg STK-MED ONCE .ROUTE ;  Start 3/26/

19 at 12:09;  Stop 3/26/19 at 12:10;  Status DC


Fentanyl Citrate (Fentanyl 2ml Vial) 100 mcg STK-MED ONCE .ROUTE ;  Start 3/26/

19 at 12:39;  Stop 3/26/19 at 12:40;  Status DC


Morphine Sulfate (Morphine Sulfate) 2 mg STK-MED ONCE .ROUTE ;  Start 3/26/19 

at 12:56;  Stop 3/26/19 at 12:57;  Status DC


Morphine Sulfate (Morphine Sulfate) 2 mg STK-MED ONCE .ROUTE ;  Start 3/26/19 

at 13:21;  Stop 3/26/19 at 13:22;  Status DC





Active Scripts


Active


Reported


Maxalt (Rizatriptan Benzoate) 10 Mg Tablet 10 Mg PO PRN PRN


Acetaminophen 500 Mg Tablet 2 Tab PO BID


Amitriptyline Hcl 25 Mg Tablet 1 Tab PO QHS


Melatonin 3 Mg Tablet 10 Mg PO HS


Vitamin D3 (Cholecalciferol (Vitamin D3)) 5,000 Unit Tablet 1 Tab PO DAILY


Loratadine 10 Mg Tablet 1 Tab PO DAILY


Celebrex (Celecoxib) 200 Mg Capsule 200 Mg PO BID 30 Days


Gabapentin 800 Mg Tablet 800 Mg PO TID


Vitals/I & O





Vital Sign - Last 24 Hours








 3/26/19 3/26/19 3/26/19 3/26/19





 11:48 12:00 12:03 12:15


 


Temp 97  97.0 





 97.0  97.0 


 


Pulse 86  76 


 


Resp 17  17 17


 


B/P (MAP) 164/67  171/85 


 


Pulse Ox 100  100 100


 


O2 Delivery Room Air Mask Simple Mask Simple Mask





 Simple Mask   


 


O2 Flow Rate 8 8 8 8.0


 


    





    





 3/26/19 3/26/19 3/26/19 3/26/19





 12:18 12:29 12:33 12:46


 


Temp 97.0  97.0 





 97.0  97.0 


 


Pulse 79  76 


 


Resp 18 18 18 18


 


B/P (MAP) 147/89  151/69 


 


Pulse Ox 98 98 98 98


 


O2 Delivery Room Air Simple Mask Room Air Room Air


 


    





    





 3/26/19 3/26/19 3/26/19 3/26/19





 12:48 12:54 13:01 13:03


 


Temp 97.0   97.0





 97.0   97.0


 


Pulse 84   78


 


Resp 18 18 18 18


 


B/P (MAP) 154/70   120/97


 


Pulse Ox 98 98 98 99


 


O2 Delivery Room Air Room Air Room Air Room Air


 


    





    





 3/26/19 3/26/19 3/26/19 3/26/19





 13:13 13:18 13:24 13:25


 


Temp  97.0  





  97.0  


 


Pulse  80  


 


Resp 20 19 20 20


 


B/P (MAP)  148/72  


 


Pulse Ox 98 98 98 99


 


O2 Delivery Room Air Room Air Room Air Room Air


 


    





    





 3/26/19 3/26/19 3/26/19 3/26/19





 13:33 13:48 14:03 14:19


 


Temp 97.0 97.0 97.9 





 97.0 97.0 97.9 


 


Pulse 74 74 72 


 


Resp 20 20 20 20


 


B/P (MAP) 126/72 126/72 131/72 


 


Pulse Ox 98 100 99 99


 


O2 Delivery Room Air Room Air Room Air Room Air


 


    





    





 3/26/19 3/26/19 3/26/19 3/26/19





 14:25 14:30 14:30 14:45


 


Temp   97.7 





   97.7 


 


Pulse   87 92


 


Resp   16 16


 


B/P (MAP)   156/96 (116) 140/102 (115)


 


Pulse Ox   96 97


 


O2 Delivery Room Air Room Air Room Air Room Air


 


    





    





 3/26/19 3/26/19 3/26/19 3/26/19





 15:00 15:15 15:45 18:13


 


Pulse 81 84 76 


 


B/P (MAP) 151/94 (113) 159/92 (114) 142/82 (102) 


 


Pulse Ox 97 97 98 


 


O2 Delivery  Room Air Room Air Room Air





 3/26/19 3/26/19 3/26/19 3/26/19





 19:05 19:30 19:30 21:31


 


Temp  98.5  





  98.5  


 


Pulse  79  


 


Resp  18  18


 


B/P (MAP)  133/89 (104)  


 


Pulse Ox 98 94  94


 


O2 Delivery Room Air Room Air Room Air Room Air


 


    





    





 3/26/19 3/26/19 3/26/19 3/27/19





 21:32 22:35 22:35 03:20


 


Temp 98.5   98.1





 98.5   98.1


 


Pulse 78   63


 


Resp 18   


 


B/P (MAP) 151/87 (108)   125/70 (88)


 


Pulse Ox 96   96


 


O2 Delivery Room Air Room Air Room Air BiPAP/CPAP


 


    





    





 3/27/19 3/27/19  





 06:33 08:25  


 


Temp 98.2   





 98.2   


 


Pulse 70   


 


Resp 18 20  


 


B/P (MAP) 154/85 (108)   


 


Pulse Ox 96   


 


O2 Delivery Room Air   














Intake and Output   


 


 3/26/19 3/26/19 3/27/19





 15:00 23:00 07:00


 


Intake Total 1150 ml 540 ml 400 ml


 


Output Total 25 ml  


 


Balance 1125 ml 540 ml 400 ml

















BRAYAN CAT MD Mar 27, 2019 09:51

## 2019-03-27 NOTE — NUR
Patient has had a restful night. No complaints of pain verbalized.  Patient has rested 
quietly this shift. Vitals remain stable. Call light in reach.

## 2019-03-27 NOTE — NUR
reviewed with wife and Quentin discharge instructions regarding restrictions to activities of 
daily living such as bathing, lifting restriction, ambulation, follow up and incisional 
care. reviewed medications especially the new meds and side effects. scripts and dressing 
given to wife. demonstrated dressing change. questions answered. saline lock removed

## 2019-03-27 NOTE — OP
DATE OF SURGERY:  03/26/2019



SURGEON:  Roman Cat MD



ASSISTANT:  Patt.



PREOPERATIVE DIAGNOSES:  Lumbar stenosis, lumbar radiculopathy, lumbar

spondylosis back pain.



POSTOPERATIVE DIAGNOSES:  Lumbar stenosis, lumbar radiculopathy, lumbar

spondylosis, back pain.



PROCEDURE:  Bilateral laminectomies at lumbar 2-3 and lumbar 3-4 with

intraoperative use of neuromonitoring.



ANESTHESIA:  General.



COMPLICATIONS:  None intraprocedurally.



INDICATIONS FOR THE PROCEDURE:  The patient is a 63-year-old gentleman who

presents with a significant lumbar radiculopathy related to degenerative lumbar

stenosis, which has been refractory to conservative management.  Please refer to

the patient's chart for additional detail.



DESCRIPTION OF PROCEDURE:  After informed consent was obtained, the patient was

brought to the operating room.  He was placed under general anesthesia.  He was

placed in the prone position on the Juan table.  All pressure points were

checked and padded appropriately.  Neuromonitoring was instituted and baseline

potentials were obtained.  An appropriate incision location was localized with

fluoroscopy and the patient had a previous lumbar incision from a prior lumbar

surgery just caudal to the current planned surgery.  This was extended, centered

over the region of lumbar 3 after the region was prepped and draped in the usual

sterile fashion.  Monopolar electrocautery was utilized after the skin incision

was made to approach the spinous processes of lumbar 2 and 3.  The spinous

process of lumbar 4 had been previously removed from a prior surgery. 

Dissection was carried out bilaterally across the lamina at these locations. 

Level was again verified with fluoroscopy prior to the initiation of

decompression and bilateral laminectomy was performed across the entirety of

lumbar 3, the inferior aspect of lumbar 2, and the superior aspect of lumbar 4. 

This was performed with the Leksell as well as a Kerrison rongeur and a

pneumatic drill.  Significantly hypertrophied ligament was encountered

underneath the lamina at all of these locations.  This was gently dissected free

with a Lindsborg and removed with a Kerrison rongeur.  The thecal sac was noted to

be very compressed upon the initiation of decompression. It was well

decompressed at all locations upon the completion of the bilateral

laminectomies.  The ligamentous material in the lateral recesses at all

locations bilaterally at lumbar 2-3 and 3-4 were removed with a Kerrison rongeur
,

as well.  The thecal sac was noted to be very well decompressed upon 
completion.  This was verified with direct visualization as well as gentle

palpation with a Jaycob.  Neuromonitoring potentials were also noted to be at

least baseline throughout the entire procedure and were much less noxiously 
sensitive

to tactile stimuli upon completion of decompression.  Pristine hemostasis was

achieved with FloSeal, cottonoids, irrigation and some use of bipolar

electrocautery.  The wound was generously irrigated with antibiotic irrigation

prior to the final closure.  The muscle and fascia were then reapproximated with

0 Vicryl in a simple interrupted fashion.  Subcutaneous tissues reapproximated

with 2-0 Vicryl in interrupted inverted fashion.  The skin was reapproximated

with 4-0 Vicryl in a running subcuticular fashion.  Mastisol and Steri-Strips

were applied and the wound was dressed with Telfa and Tegaderm.  At the end of

procedure, all needle and sponge counts were correct.  Ancef was utilized as a

prophylactic antibiotic prior to the initiation of the procedure.  The patient

was subsequently extubated in the operating room and taken to recovery in stable

condition.  There were no intraprocedural complications apparent.

 



______________________________

ROMAN CAT MD



DR:  [email or web address]/arti  JOB#:  [id number] / [id number]

DD:  03/27/2019 10:36  DT:  03/27/2019 12:15

RIVER

## 2019-03-28 NOTE — PATHOLOGY
[facility] Accession Number: [id number]

.                                                                01

Material submitted:                                        .

DECOMPRESSION, LUMBAR

.                                                                01

Clinical history:                                          .

Lumbar stenosis

.                                                                02

**********************************************************************

Diagnosis:

Segments of fibrocartilaginous, adipose, and skeletal muscle tissue and

bone, lumbar decompression:

- Degenerative changes of fibrocartilaginous tissue.

.

(JPM:mml; 03/28/2019)

[facility]/03/28/2019

**********************************************************************

.                                                                02

Comment:

There is no evidence of an acute inflammatory process or malignancy.

.

(JPM:mml; 03/28/2019)

.                                                                02

Electronically signed:                                     .

Robert Cm MD, Pathologist

NPI- [id number]

.                                                                01

Gross description:                                         .

The specimen is received in formalin, labeled "Alexandr, Quentin, decompression"

and additionally labeled on the requisition as, "lumbar decompression"

and consists of multiple segments of fibrous pink-tan tissue and bone

measuring 9.9 x 8.0 x 1.4 cm in aggregate.  Representative tissue is

submitted in A1 following decalcification.

(SDY; 3/26/2019)

SYU/SYU

.                                                                02

Pathologist provided ICD-10:

M51.36

.                                                                02

CPT                                                        .

734374, [id number]

Specimen Comment: A courtesy copy of this report has been sent to

Specimen Comment: [phone], [phone].

Specimen Comment: Report sent to  / DR ALVAREZ

Specimen Comment: A duplicate report has been generated due to demographic updates.

***Performed at:  01

   [facility]

   [address], [place], [place]  [id number]

   MD Mele Valadez MD Phone:  [phone]

***Performed at:  02

   [facility]

   [address], [place]  [id number]

   MD Robetr Cm MD Phone:  [phone]

## 2019-06-28 NOTE — RAD
MRI of the lumbar spine without and with contrast 6/28/2019

 

CLINICAL HISTORY: Low back pain with history of previous lumbar spine 

surgeries.

 

TECHNIQUE: Unenhanced T1-weighted and T2-weighted sagittal and axial 

inversion parasagittal images the lumbar spine were obtained. After the 

intravenous administration of 21 cc of Dotarem, enhanced T1-weighted 

sagittal and axial images of the lumbar spine were obtained.

 

FINDINGS: Comparison is made to patient's MRI of the lumbar spine dated 

7/18/2018. Additional comparison is made to patient's lumbar myelogram 

dated 2/18/2019.

 

Very mild S-shaped curvature of the thoracolumbar spine is seen. The 

patient is post left posterior lateral fusion at L4-5 using pedicle screws

and stabilizing arthur and bone graft material. The patient is post 

laminectomy at L2-3 and L3-4. This is new since the previous examinations.

Degenerative signal changes are seen involving all of the disks of the 

lumbar spine. Degenerative signal changes are seen within the marrow 

surrounding these discs. Loss of height of the L4-5 disc is noted. The 

conus medullaris is within normal limits in morphology, position, and 

signal characteristics.

 

At the L1-2 disc space there is a mild generalized disc bulge. 

Degenerative changes are seen involving the facet joints bilaterally. 

These findings do not result in significant central spinal canal or neural

foraminal stenosis.

 

At the L2-3 disc space there is a mild to moderate generalized disc bulge.

This is eccentric to the right. Superimposed on the disc bulge is a 

central/right paracentral focal disc protrusion. This measures 3 mm in AP 

diameter. Degenerative changes are seen involving the facet joints 

bilaterally. There are small facet joint effusions bilaterally. The severe

central spinal canal stenosis seen on the previous examination has been 

decompressed within the laminectomy. No neural foraminal stenosis is seen.

 

At the L3-4 disc space there is a mild generalized disc bulge. 

Degenerative changes are seen involving the facet joints, left greater 

than right. The severe central spinal canal stenosis seen on the previous 

examination has been decompressed by the laminectomy. Mild to moderate 

left greater than right neural foraminal stenosis is seen.

 

At the L4-5 level degenerative changes are seen involving the facet joints

bilaterally. No significant central spinal canal stenosis is seen. Mild 

bilateral neural foraminal stenosis is noted.

 

At the L5-S1 disc space there is a mild generalized disc bulge. 

Degenerative changes are seen involving the facet joints bilaterally. 

These findings do not result in significant central spinal canal stenosis.

Mild to moderate bilateral neural foraminal stenosis is seen.

 

IMPRESSION:

1. Post left posterolateral fusion at L4-5. Post laminectomy at L2-3 and 

L3-4. The severe central spinal canal stenosis has been decompressed by 

the laminectomy at these levels since the previous study.

2. The changes of degenerative disc disease are seen throughout the lumbar

spine. These findings do not result in significant central spinal canal 

stenosis. Mild to moderate left greater than right neural foraminal 

stenosis is seen at L3-4. Mild to moderate bilateral neural foraminal 

stenosis is seen at L4-5. Mild bilateral neural foraminal stenosis is seen

at L4-5.

 

Electronically signed by: Horacio Hightower MD (6/28/2019 5:08 PM) [facility]

## 2019-08-15 NOTE — RAD
MRI Lumbar Spine without and with contrast

 

History: Continued low back pain, history of fusion

 

Technique: Multiplanar, multi sequential pre and postcontrast MR imaging 

was performed of the lumbar spine.

 

Comparison: June 28, 2019

 

Findings: 

There is again left posterolateral fusion hardware with left pedicle 

screws at L4 and L5. Lumbar vertebral body stature is unchanged. AP 

alignment is unchanged. There is again severe narrowing of the L4-5 

intervertebral disc space, mild degenerative disc disease L5-S1, L3-4, 

L2-3, and mild disc desiccation L1-2. Conus terminates near L1. There is 

no nodular enhancement of the conus or cauda equina, no significant 

enhancement or fluid in the intervertebral disc spaces. There is mild 

edema of the posterior, inferior L3 endplate as seen previously. 

 

L1-L2:  Spinal canal and neural foramina are adequate.

 

L2-L3:  There again has been posterior decompression. There is again facet

hypertrophic change. There is again mild anterior epidural signal coursing

slightly below the intervertebral disc space more centrally possibly 

component of tiny extrusion although some degree of mild enhancement, 

overall similar in appearance. There is a similar degree of minimal 

narrowing of the far lateral recesses greater on the right. There is 

minimal inferior narrowing of the right neural foramen, left neural 

foramen overall adequate.

 

L3-L4:  There again has been posterior decompression centrally. There is 

again facet degenerative change. There is mild peripheral dural 

enhancement as seen previously, also enhancement posterior at site of 

posterior decompression. There is a similar degree of mild narrowing of 

the far lateral recesses greater on the left. There is again mild inferior

narrowing of the right neural foramen by disc osteophyte complex. There is

again moderate to severe narrowing somewhat greater distally of the left 

neural foramen by disc osteophyte complex and facet with contact exiting 

left L3 nerve root extending to proximal extraforaminal region.

 

L4-L5:  There again has been posterior decompression. There is facet 

degenerative change. There is mild narrowing of the right neural foramen. 

Osteophytes contribute to moderate to severe narrowing left neural foramen

from posteriorly and to lesser degree inferiorly as seen previously.

 

L5-S1:  There is facet degenerative change and mild buckling of the 

ligamentum flavum. Spinal canal is overall adequate. There is again fairly

severe narrowing of the right neural foramen by disc osteophyte 

complex/osteophytes and facet degenerative change with contact exiting 

right L5 nerve root, also at least moderate narrowing of the left neural 

foramen by osteophytes although somewhat poorly characterized.

 

Impression: 

 

1.  Findings are similar compared with previous June 28, 2019 exam. There 

is again left posterolateral fusion hardware L4-5, multilevel posterior 

decompression L2-3 through L4-5. There is a similar degree of mild 

narrowing of the far lateral recesses most notable on the left at L3-4 and

right greater than left at L2-3. There is neural foramina compromise as 

stated, more significant narrowing right greater than left at L5-S1 and on

the left at L4-5 and L3-4.

 

Electronically signed by: Pool Steinberg MD (8/15/2019 11:17 AM) 

[facility]

## 2019-12-24 NOTE — PAIN
DATE OF SERVICE:  12/23/2019



PROGRESS NOTE FOR PAIN CLINIC



DIAGNOSES:  Lumbar radiculopathy with lumbar degenerative disk disease and

lumbar post-laminectomy syndrome.



HISTORY OF PRESENT ILLNESS:  The patient is a 63-year-old male who returns for

followup, last seen about 1 year ago, 12/27/2018.  The patient had lumbar

epidural steroid injection at that time, did very well, subsequently had surgery

in March of this year with decompressive surgery at L2-L3 and L3-L4 levels.  The

patient reports he did very well for several months after the surgery, but the

pain is returning now, but different in the low back into the left posterior

gluteus, posterolateral thigh and posterior calf on the left side only.  The

patient reports it is much lower than the pain as he had previously and more

into the leg.  The patient reports that it is 10 on a scale of 10 at its worst

over the past week, 8 on average, 5 at its least.  He did have a new MRI scan

dated 08/2019.  We reviewed that with both he and his spouse, showing some

posterolateral fusion hardware at L4-L5, multiple decompression L2-L3, L3-L4,

L4-L5 with neural foraminal compromise, more significant right narrow greater

than left at L5-S1 and on the left at L4-L5 and L3-L4.  The patient reports the

pain is a 10 on a scale of 10 at its worst over the past week, 8 on average, 5

at its least and is a 5 today.  The patient reports it is sharp, radiating,

aching, shooting and dull across the back and also sometimes tingling in the leg

as well.  It also awakes him from sleep very rarely, better with sitting or

lying down, worse with walking, standing, changing positions.  The patient has

tried oral prednisone without significant decrease in pain, having difficulty

bending at the waist, especially when he bends it, becoming less active and much

more docile, staying in a chair most of the time when he is at home.



PHYSICAL EXAMINATION:

VITAL SIGNS:  The patient's blood pressure is 151/90, pulse 69, respirations 16,

temperature 98.0 degrees Fahrenheit, height is 5 feet 11 inches and weight is

242 pounds.

GENERAL:  The patient is awake, alert, oriented, appropriate, very pleasant

demeanor.

HEENT:  Normocephalic, atraumatic.  Extraocular movements are intact and

symmetrical.  Oral cavity shows mucous membranes moist and pink.  Dentition is

intact.

NECK:  Shows anterior throat supple without palpable lymphadenopathy noted. 

Swallow reflex symmetrical.

CHEST:  Shows normal on inspection.  Breath sounds are clear bilaterally.

HEART:  Shows S1, S2 clear.  No murmurs auscultated.

ABDOMEN:  Soft, nontender, nondistended.  No palpable organomegaly is noted.  No

rebound or guarding demonstrated.

BACK:  Shows spine grossly in the midline.  Normal appearing thoracic kyphosis

and lumbar lordotic curvature is flattened with well-healed surgical scar noted.

 Lumbar paraspinous muscle shows symmetrical on inspection, with palpation shows

some moderate tenderness diffusely bilaterally, but only diffusely without

radiation.  The patient has good rotational motion of lumbar spine, some minor

limitation in extension, but without significant pain.

EXTREMITIES:  Lower extremities show deep tendon reflexes at 2+ in patellar, 1+

tendo-calcaneus tendons.  Motor exam is strong with approximately 4 on a scale

of 5 with dorsiflexion, extension on the left and 5/5 on the right.  Quadriceps

and hamstring flexion, however, is 5/5 equal bilaterally.  Peripheral pulses are

1+ posterior tibial.  No peripheral edema bilaterally.



Options were discussed with the patient.  The patient's old chart was reviewed

as his current medication regimen updated.  Current review of systems updated

today as well.  We will proceed with a lumbar epidural steroid injection today

first in this series with fluoroscopic guidance.  Risks were again discussed

including, but not limited to bleeding, infection, possibility of epidural

hematoma, subsequent neurological compromise, dural puncture, headaches, spinal

cord and/or nerve damage, side effects of steroid medication and poor results

regarding pain control.  The patient understands and wished to proceed.  The

patient will return to clinic in approximately 2 weeks for followup, was

counseled on return appointment, activity level and side effects to be aware of.



DIAGNOSES:  Lumbar radiculopathy with lumbar degenerative disk disease and

lumbar post-laminectomy syndrome.



PROCEDURE:  Lumbar epidural steroid injection, translaminar approach L5-S1 level

using C-arm fluoroscopic guidance under sterile prep and drape using local

anesthetic.



MEDICATION INJECTED:  A total of 120 mg Depo-Medrol plus 10 mL of

preservative-free normal saline and 2 mL of contrast.



CONDITION AT DISCHARGE:  Stable.  The patient tolerated the procedure well, had

no complications.

 



______________________________

XIANG BARON MD



DR:  Svetlana  JOB#:  [id number] / [id number]

DD:  12/23/2019 10:07  DT:  12/23/2019 23:04

## 2020-01-15 NOTE — PAIN
DATE OF SERVICE:  01/15/2020



PROGRESS NOTE FOR PAIN CLINIC



DIAGNOSES:  Lumbar radiculopathy with lumbar degenerative disk disease and

lumbar post-laminectomy syndrome.



HISTORY OF PRESENT ILLNESS:  The patient is a 64-year-old male who returns for

followup status post lumbar epidural steroid injection x 1 on 12/23/2019.  The

patient reports about 100% improvement just for a few days following the

injection, the pain returned in the low back, left lower extremity radiating to

posterior gluteus, posterior thigh and posterior calf.  The patient reports it

as a 10 on a scale of 10 over the past week, 8 on average, 6 at its least and is

a 6 today.  The patient reports it is sharp, severe, becoming more unbearable,

worse with walking, standing, changing positions, better with sitting or lying

down, does not awaken her from sleep at night, initially for the first few days,

he was increasing distance walking, doing work activities, household activities

with greater ease and comfort, now the pain is returning in the left lower

extremity and low back.  The patient reports no new motor or sensory deficits,

no new bowel or bladder incontinence or other complaints.



PHYSICAL EXAMINATION:

VITAL SIGNS:  The patient's blood pressure 117/66, pulse is 50, respirations are

18, temperature is 98.0 degrees Fahrenheit, height is 5 feet 11 inches, weight

is 239 pounds.

GENERAL:  The patient is awake, alert, oriented, appropriate, very pleasant

demeanor.

HEENT:  Head shows normocephalic, atraumatic.  Extraocular movements are intact

and symmetrical.  Oral cavity:  Mucous membranes moist and pink.  Dentition is

intact.

NECK:  Shows anterior throat supple without palpable lymphadenopathy noted. 

Swallow reflex symmetrical.

CHEST:  Shows normal on inspection.  Breath sounds clear to auscultation

bilaterally.

HEART:  Shows S1, S2 clear.  No murmurs auscultated.

ABDOMEN:  Soft, nontender, nondistended.  No palpable organomegaly is noted.  No

rebound or guarding demonstrated.

BACK:  Shows spine grossly in the midline.  The patient has well-healed surgical

scars noted in the midline as well.  With palpation shows moderate tenderness

diffusely bilaterally in the low lumbar paraspinous musculature only, but is

symmetrical without evidence of hypertrophy, no trigger points, no radiation of

pain.  The patient has good rotational motion of lumbar spine, both laterally as

well as extension and flexion without significant increase in pain.

EXTREMITIES:  The patient's lower extremities show deep tendon reflexes 2+ in

the patellar, 1+ tendo-calcaneus tendons.  Motor exam is approximately 4 on a

scale of 5 with left dorsiflexion, extension, 5/5 on the right.  Peripheral

pulses are 1+.  No peripheral edema is noted.



Options were discussed with the patient.  The patient's old chart was reviewed. 

His current medication regimen updated.  Current review of systems updated today

as well.  We will proceed with a second in the series of lumbar epidural steroid

injection today with fluoroscopic guidance.  Risks were again discussed

including, but not limited to bleeding, infection, possibility of epidural

hematoma, subsequent neurologic compromise, dural puncture, headaches, spinal

cord and/or nerve damage, side effects of steroid medication and poor results

regarding pain control.  The patient understands and wished to proceed.  The

patient will return to clinic in approximately 2 weeks for followup.  He was

counseled on return appointment, activity level and side effects to be aware of.

 The patient also has upcoming evaluation with his neurosurgeon.  He will

maintain this appointment as well later this month.



DIAGNOSES:  Lumbar radiculopathy with lumbar degenerative disk disease and

lumbar post-laminectomy syndrome.



PROCEDURE:  Lumbar epidural steroid injection, translaminar approach L5-S1 level

using C-arm fluoroscopic guidance under sterile prep and drape using local

anesthetic.



MEDICATION INJECTED:  A total of 120 mg Depo-Medrol plus 10 mL of

preservative-free normal saline and 2 mL of contrast.



CONDITION AT DISCHARGE:  Stable.  The patient tolerated the procedure well, had

no complications.

 



______________________________

XIANG BARON MD



DR:  KETAN/arti  JOB#:  [id number] / [id number]

DD:  01/15/2020 08:26  DT:  01/15/2020 08:46

## 2020-01-24 NOTE — RAD
MRI of the lumbar spine without and with contrast 1/24/2020

 

CLINICAL HISTORY: Low back pain. History of previous lumbar spine surgery.

 

TECHNIQUE: Unenhanced T1-weighted and T2-weighted sagittal and axial and 

inversion recovery sagittal images of the lumbar spine were obtained. 

After the intravenous administration of 20 cc of DOTAREM, enhanced 

T1-weighted sagittal and axial images of the lumbar spine were obtained.

 

FINDINGS: Comparison study is dated 8/15/2019.

 

Minimal S-shaped curvature of the thoracolumbar spine is seen. The patient

is post laminectomy and fusion using pedicle screws and stabilizing rods 

at L4-5. The patient is post laminectomy at L3. Degenerative signal 

changes are seen involving all of the disks of the lumbar spine. 

Degenerative signal changes are seen within the marrow surrounding these 

discs. Loss of height of the L2-3, L4-5 and L5-S1 discs is noted. The 

conus medullaris is normal morphology, position, and signal 

characteristics.

 

At the L1-2 disc space there is a mild generalized disc bulge. 

Degenerative changes are seen involving the facet joints bilaterally. 

These findings do not result in significant central spinal canal or neural

foraminal stenosis.

 

At the L2-3 disc space there is a mild generalized disc bulge. 

Superimposed on the disc bulge is a focal central disc herniation which 

extrudes slightly inferiorly. This measures 1.3 x 1.2 x 0.7 cm in 

craniocaudal, transverse and AP dimensions. This is new since the previous

examination. Degenerative changes are seen involving the facet joints 

bilaterally. There is moderate ligamentum flavum hypertrophy bilaterally. 

Small facet joint effusions are seen bilaterally. These findings when 

combined result in moderate central spinal canal stenosis. No neural 

foraminal stenosis is seen.

 

At the L3-4 disc space there is a mild generalized disc bulge. 

Degenerative changes are seen involving the facet joints bilaterally. 

These findings do not result in significant central spinal canal stenosis.

Mild bilateral neural foraminal stenosis is seen.

 

At the L4-5 disc space degenerative changes are seen involving the facet 

joints bilaterally. These findings do not result in significant central 

spinal canal or neural foraminal stenosis.

 

At the L5-S1 disc space there is a mild generalized disc bulge. 

Degenerative changes are seen involving the facet joints bilaterally. 

There is moderate ligamentum flavum hypertrophy bilaterally. These 

findings when combined do not result in significant central spinal canal 

stenosis. Mild to moderate bilateral neural foraminal stenosis is seen.

 

IMPRESSION:

1. Post laminectomy at L3 and post laminectomy and fusion at L4-5.

2. The changes of degenerative disc disease are seen throughout the lumbar

spine. These findings result in moderate central spinal canal stenosis at 

L2-3. Mild bilateral neural foraminal stenosis is seen at L3-4. Mild to 

moderate bilateral neural foraminal stenosis is seen at L5-S1. At the L2-3

disc space a focal central disc herniation is seen which is new since 

previous study. This extrudes inferiorly and deforms anterior aspect of 

thecal sac.

 

Electronically signed by: Horacio Hightower MD (1/24/2020 3:06 PM) [facility]

## 2020-02-21 NOTE — RAD
CHEST PA   LATERAL

 

History: Preoperative evaluation. Discectomy of the spine scheduled. 

 

Comparison: 3/19/2019 two-view chest x-ray exam.

 

Findings: 

Frontal and lateral views of the chest were obtained. Postoperative 

cervical spine fusion noted. The cardiomediastinal silhouette is normal. 

Pulmonary vasculature is normal. The lungs are clear. No pleural effusion 

or pneumothorax is seen. There is no acute bone abnormality. 

 

IMPRESSION: 

No acute cardiopulmonary process. 

 

 

Electronically signed by: Angelito Osorio MD (2/21/2020 7:16 PM) [id number]

## 2020-02-21 NOTE — EKG
[facility]

              [address] [place] [address]

Test Date:    2020               Test Time:    14:57:32

Pat Name:     KENNEDI CABRERA                Department:   

Patient ID:   [id number]           Room:          

Gender:       M                        Technician:   VERENA

:          1955               Requested By: BRAYAN CAT

Order Number: [id number]           Reading MD:   Raúl Forbes

                                 Measurements

Intervals                              Axis          

Rate:         64                       P:            34

AR:           162                      QRS:          17

QRSD:         86                       T:            34

QT:           378                                    

QTc:          394                                    

                           Interpretive Statements

SINUS RHYTHM

NONSPECIFIC ST-T WAVE CHANGES.



Electronically Signed On 2020 11:33:23 CST by Raúl Forbes

## 2020-02-25 NOTE — PDOC
BRIEF OPERATIVE NOTE


Date:  Feb 25, 2020


Pre-Op Diagnosis


lumbar disk herniation L2-3 with stenosis and radiculopathy


Post-Op Diagnosis


same


Procedure Performed


expansion of L2-3 laminectomy, L2-3 discectomy


Anesthesia Type:  General


Blood Loss


20mL


Specimens Obtained


disk and decompression


Findings


prominent stenosis largely due to disk herniation L2-3, neuromonitoring at least

baseline throughout procedure


Complications


none apparent











BRAYAN CAT MD              Feb 25, 2020 11:20

## 2020-02-26 NOTE — OP
DATE OF SURGERY:  02/25/2020



SURGEON:  Roman Cat MD



ASSISTANT:  Patt



PREOPERATIVE DIAGNOSES:  Lumbar disk herniation with lumbar stenosis and lumbar

radiculopathy.



POSTOPERATIVE DIAGNOSES:  Lumbar disk herniation with lumbar stenosis and lumbar

radiculopathy.



PROCEDURE:  Extension of prior lumbar 2-3 laminectomy with lumbar 2-3 diskectomy

Intraoperative use of neuro monitoring and intraoperative use of microscope.



ANESTHESIA:  General.



COMPLICATIONS:  None intraprocedurally.



INDICATIONS FOR THE PROCEDURE:  The patient is a 64-year-old gentleman with low

back and lower extremity pain localized to a new disk herniation at lumbar 2-3. 

He has been refractory to nonsurgical treatments.  Please refer to the patient's

chart for additional details.



DESCRIPTION OF PROCEDURE:  After informed consent was obtained, the patient was

brought to the operating room and placed under general anesthesia.  Ancef was

utilized as a prophylactic antibiotic.  He was placed in the prone position on

the Juan table.  All pressure points were checked and padded appropriately. 

The patient had a previous lumbar incision.  Appropriate location at lumbar 2-3

was localized with fluoroscopy prior to the initiation of the procedure.  Lumbar

region was prepped and draped in the usual sterile fashion.  The cephalad

portion of this prior incision was reopened with a 10 blade scalpel centered

over the region of lumbar 2-3, electrocautery was utilized to dissect the

avascular midline to the remainder of the spinous processes of lumbar 2 and

dissection was carried out bilaterally across the partial lamina at lumbar 2 in

the region of lumbar 2-3 junction.  Level was again verified with fluoroscopy

and a laminectomy was performed extending from the previous partial laminectomy

of lumbar 2 cephalad, primarily on the left side, which shows location of the

patient's lower extremity pain.  Laminectomy was carried to some degree to the

right side as well at this location.  Scar tissue was encountered and this was

gently dissected free from the thecal sac with a blunt nerve hook as well as a

Menard.  Lateral recess of the lumbar 2-3 junction was decompressed with a

Kerrison rongeur.  Significant scar tissue and residual ligament was noted. 

Once this was complete, the thecal sac and the adjacent nerve root was gently

retracted medially and a prominent annulus was encountered.  A 10 blade scalpel

was utilized to create a small incision in the annulus at lumbar 2-3 and disk

material emerged under pressure.  This was gently teased posterolaterally with a

blunt nerve hook as well as a Jaycob.  Disk material was removed in a piecemeal

fashion with pituitary rongeur.  Upon completion of the diskectomy, the annulus

was noted to be flat and the adjacent neural elements were noted to be very well

decompressed.  This was verified with direct visualization as well as gentle

palpation with a Jaycob.  Upon completion of decompression and diskectomy, the

wound was generously irrigated with antibiotic irrigation.  Pristine hemostasis

was achieved with FloSeal, cottonoids irrigation and some use of bipolar

electrocautery.  The muscle and fascia were then reapproximated with 0 Vicryl in

a simple interrupted fashion.  Subcutaneous tissues reapproximated with 2-0

Vicryl in interrupted inverted fashion and the skin was reapproximated with 4-0

Vicryl in a running subcuticular fashion.  Mastisol and Steri-Strips were

applied and the wound was dressed with Telfa and Tegaderm.  At the end of

procedure, all needle and sponge counts were correct.  Neuro monitoring remained

at least baseline throughout the entire procedure.  There were no

intraprocedural complications apparent.  The patient was then extubated in the

operating room and taken to recovery in stable condition.

 



______________________________

ROMAN CAT MD DR:  ALEKSANDAR/arti  JOB#:  [id number] / [id number]

DD:  02/26/2020 13:32  DT:  02/26/2020 15:17

## 2020-02-26 NOTE — PATHOLOGY
[facility] Accession Number: [id number]

.                                                                01

Material submitted:                                        .

vertebral column - LUMBAR DECOMPRESSION AND DISC

.                                                                01

Clinical history:                                          .

Lumbar herniated disc, stenosis, radiculopathy

.                                                                02

**********************************************************************

Diagnosis:

Segments of fibrocartilaginous and skeletal muscle tissue and bone, lumbar

disc and decompression:

- Degenerative changes of fibrocartilaginous tissue.

.

(JPM:jesse; 02/26/2020)

QMS  02/26/2020  1233 Local

**********************************************************************

.                                                                02

Comment:

There is no evidence of an acute inflammatory process or malignancy.

.                                                                02

Electronically signed:                                     .

Robert Cm MD, Pathologist

NPI- [id number]

.                                                                01

Gross description:                                         .

The specimen is received in formalin, labeled "Quentin Strange, lumbar

decompression and disc".  Received are multiple segments of pink-gray,

rubbery and gritty fibrous tissue admixed with fragments of bone measuring

4.2 x 3.8 x 1.4 cm in aggregate dimensions.  The specimen is submitted

representatively in cassette A1, following decalcification.

([facility]; 2/25/2020)

QAC/QAC  02/26/2020  1232 Local

.                                                                02

Pathologist provided ICD-10:

M51.26, M99.59, M54.10

.                                                                02

CPT                                                        .

583971, [id number]

Specimen Comment: A courtesy copy of this report has been sent to [phone], [phone]

Specimen Comment: [id number]

Specimen Comment: Report sent to  / DR ALVAREZ

***Performed at:  01

   [facility]

   [address], [place], [place]  [id number]

   MD Mele Valadez MD Phone:  [phone]

***Performed at:  02

   [facility]

   [address], [place]  [id number]

   MD Robert Cm MD Phone:  [phone]

## 2021-06-02 NOTE — PDOC
Progress Note - Pain Clinic


Date of Service:


DOS:


DATE: 6/2/21 


TIME: 08:46





Diagnosis:


Dx:


Lumbar radiculopathy with lumbar degenerative disc disease and lumbar 

postlaminectomy syndrome


Cervical radiculopathy with cervical degenerative disease and cervical 

postlaminectomy syndrome





History or Present Illness:


HPI:


65-year-old male returns for follow-up status post lumbar epidural steroid 

injections most recently January 2020 patient had surgery in February of that 

year with L3-4 discectomy as well as L45 previous fusion patient had MRI scan on

May 17, 2021 showing postsurgical changes moderate to severe spinal canal 

stenosis L3-4 progressed from prior exam degenerative changes remain the same 

2345 and 5 1.  Patient reports pain was doing much better after surgery however 

pain is returning now in the right side in the low back and radiated to the 

anterior thigh medial thigh especially in the groin and into the medial knee 

worse with walking standing changing positions better with sitting or laying 

down generally is not awakening from sleep at night is fairly comfortable 

sitting for prolonged periods as well patient reports his pain is 8 on scale 10 

is worse over the past week 6 on average 5 its least is a 5 today while sitting 

patient reports that sharp and stabbing can be radiating constant shooting in 

the right lower extremity as noted.  Patient reports no significant pain on the 

left side over left lower extremity.  Patient reports no new motor or sensory 

deficits no new bowel or bladder incontinence or other complaints.





Physical Exam:


VS:


Blood pressure is 145/83 pulse 78 respirations 18 temperature 98.5 F height is 

5 feet 11 inches weight is 234 pounds


PE:


PHYSICAL EXAMINATION:





GENERAL: The patient is awake, alert, oriented, appropriate, very pleasant 

demeanor


HEENT: Shows normocephalic, atraumatic.  Extraocular movements are intact and 

symmetrical.  Oral cavity: Mucous membranes moist and pink.  Dentition is 

intact.


NECK: Shows anterior throat supple without palpable lymphadenopathy noted.  

Swallow reflex symmetrical.


CHEST: Shows normal on inspection.  Breath sounds are clear bilaterally, no 

rales rhonchi wheezes auscultated.


HEART: Shows S1, S2 clear.  No murmurs auscultated.


ABDOMEN: Soft, nontender, nondistended, obese.  No palpable organomegaly is 

noted.  No rebound or guarding demonstrated.


BACK: Shows spine grossly in the midline.  Normal-appearing cervical lordotic 

curvature.  There is slightly increased thoracic kyphosis, some minor flattening

of the lumbar lordotic curvature.  Well-healed midline surgical scar is noted.  

Lumbar paraspinous muscles show symmetrical on inspection, on palpation shows 

some moderate tenderness diffusely throughout the upper, middle and lower 

distribution of the paraspinous muscles without specific trigger points, without

radiation of pain.  The patient has good rotational motion of the lumbar spine, 

both laterally as well as extension and flexion without significant difficulty. 

No tenderness over the spinous processes, sacrum or sacroiliac regions.


EXTREMITIES: Lower extremities show deep tendon reflexes 2+ in the patellar and 

tendo calcaneus tendons.  Motor exam is 4 on a scale of 5 with right 

dorsiflexion, extension, quadriceps and hamstring flexion and 5/5 on the left.  

Peripheral pulses are 1+ posterior tibial.  No peripheral edema is noted 

bilaterally.  Lower extremities are warm and dry to touch, equal in color and 

appearance.  Straight leg raise noted to be positive on the right about 40 

degrees, left side is negative.  Gaenslen's and Kosta's maneuvers are negative

bilaterally as well.  


SKIN: Shows warm and dry, good turgor.  No edema.  No sores, rashes or bruising 

throughout.





Procedure:


Procedure:


Options were discussed with the patient.  Patient's old chart was reviewed his 

his current medication regimen updated current review of systems updated today 

as well.  We will proceed with lumbar epidural steroid injection stable 

fluoroscopic guidance.  Risks were discussed including but not limited to: 

Bleeding, infection, possibility of epidural hematoma and subsequent 

neurological compromise, dural puncture, headaches, spinal cord and/or nerve 

damage, side effects of steroid medication, and poor results regarding pain 

control.  Patient understands and wished to proceed.  Patient will return to the

clinic in approximate 2 weeks for follow-up, was counseled as to return appoi

ntment activity level and side effects to be aware of.





Medication Injected:


Med Injected:


Procedure is lumbar epidural steroid injection under local anesthetic using 

sterile prep and drape at the L3-4 level using C-arm fluoroscopic guidance in 

both AP and lateral views medications injected is 120 mg Depo-Medrol +10mL 

preservative-free normal saline and 2 mL contrast- condition at discharge is 

stable patient tolerated procedure well had no complications.





Condition at Discharge:


Condition at Discharge:


Condition at discharge stable, patient tolerated the procedure well and had no 

complications.











XIANG BARON MD                Jun 2, 2021 08:49

## 2021-06-02 NOTE — PDOC4
PROCEDURE


Procedure


Patient was consented for lumbar epidural steroid injection.  Risks were dis

cussed including but not limited to: Bleeding, infection, possibility of 

epidural hematoma and subsequent neurological compromise, dural puncture, 

headaches, spinal cord and/or nerve damage, side effects of steroid medication, 

and poor results regarding pain control.  Patient understands and wished to 

proceed.  


Procedure is lumbar epidural steroid injection under local anesthetic using 

sterile prep and drape at the L3-4 level using C-arm fluoroscopic guidance in 

both AP and lateral views medications injected is 120 mg Depo-Medrol +10mL 

preservative-free normal saline and 2 mL contrast- condition at discharge is 

stable patient tolerated procedure well had no complications.











XIANG BARON MD                Jun 2, 2021 08:46

## 2021-06-16 NOTE — PDOC4
PROCEDURE


Procedure


Patient is consented for lumbar epidural steroid injection.  Risks were disc

ussed including but not limited to: Bleeding, infection, possibility of epidural

hematoma and subsequent neurological compromise, dural puncture, headaches, 

spinal cord and/or nerve damage, side effects of steroid medication, and poor 

results regarding pain control.  Patient understands and wished to proceed.


Procedure is lumbar epidural steroid injection under local anesthetic using 

sterile prep and drape at the L3-4 level using C-arm fluoroscopic guidance in 

both AP and lateral views medications injected is 120 mg Depo-Medrol +10mL 

preservative-free normal saline and 2 mL contrast- condition at discharge is 

stable patient tolerated procedure well had no complications.











XIANG BARON MD               Jun 16, 2021 09:47

## 2021-06-16 NOTE — PDOC
Progress Note - Pain Clinic


Date of Service:


DOS:


DATE: 6/16/21 


TIME: 09:44





Diagnosis:


Dx:


Lumbar radiculopathy with lumbar degenerative disc disease and lumbar 

postlaminectomy syndrome


Cervical radiculopathy with cervical degenerative disease and cervical 

postlaminectomy syndrome





History or Present Illness:


HPI:


65-year-old male returns to follow-up status post lumbar epidural straight 

injection x1 last seen June 7, 2021.  Patient reports about 25% improvement in 

his low back and right lower extremity still some pain in the back and the right

leg with walking and standing for prolonged periods but doing much better than 

it was patient reports is tingling pain in the low back radiating the right 

lower extremity posterior gluteus lateral thigh anterior thigh medial thigh pa

tient reports is an 8 on scale 10 is worse over the past week 6 on average for 

its least and is a 6 today patient reports he was doing much better with 

increased activity doing walking greater distances doing work activities 

household activities traveling with some boating as well which was performed 

with reasonable comfort.  Patient reports is not awakening from sleep at this 

time reports no new motor or sensory deficits no new bowel or bladder 

incontinence or other complaints.





Physical Exam:


VS:


Blood pressure is 142/74 pulse 78 respirations 18 temperature 90.7 F height 5 

feet 11 inches weight 235 pounds


PE:


PHYSICAL EXAMINATION:





GENERAL: The patient is awake, alert, oriented, appropriate, very pleasant in 

demeanor


HEENT: Shows normocephalic, atraumatic.  Extraocular movements are intact and 

symmetrical.  Patient wearing eyeglasses oral cavity: Mucous membranes moist and

pink.  Dentition is intact.


NECK: Shows anterior throat supple without palpable lymphadenopathy noted.  

Swallow reflex symmetrical.


CHEST: Shows normal on inspection.  Breath sounds are clear bilaterally.


HEART: Shows S1, S2 clear.  No murmurs auscultated.


ABDOMEN: Soft, nontender, nondistended, obese. 


BACK: Shows spine grossly in the midline.  Normal-appearing cervical lordotic 

curvature.  There is slightly increased thoracic kyphosis, some minor flattening

of the lumbar lordotic curvature.  Well-healed surgical scar is noted in the 

midline.  Lumbar paraspinous muscles show symmetrical on inspection, on 

palpation shows some moderate tenderness diffusely throughout the upper, middle 

and lower distribution of the paraspinous muscles without specific trigger 

points, without radiation of pain.  The patient has good rotational motion of th

e lumbar spine, both laterally as well as extension and flexion without 

significant difficulty.  No tenderness over the spinous processes, sacrum or 

sacroiliac regions.


EXTREMITIES: Lower extremities show deep tendon reflexes 2+ in the patellar and 

tendo calcaneus tendons.  Motor exam is 4 on a scale of 5 with right 

dorsiflexion, extension, quadriceps and hamstring flexion and 5/5 on the left.  

Peripheral pulses are 1+ posterior tibial.  No peripheral edema is noted 

bilaterally.  Lower extremities are warm and dry.


SKIN: Shows warm and dry, good turgor.  No edema.  No sores, rashes or bruising 

throughout.





Procedure:


Procedure:


Options were discussed with the patient.  Patient chart reviews her current 

medication regimen updated current review of systems updated today as well.  We 

will proceed with a second in a series lumbar epidural steroid injection stable 

fluoroscopic guidance.  Risks were discussed including but not limited to: 

Bleeding, infection, possibility of epidural hematoma and subsequent 

neurological compromise, dural puncture, headaches, spinal cord and/or nerve 

damage, side effects of steroid medication, and poor results regarding pain 

control.  Patient understands and wished to proceed.  She will return to clinic 

in approximate 2 weeks for follow-up, was counseled return appointment, activity

level, and side effects to be aware.





Medication Injected:


Med Injected:


Procedure is lumbar epidural steroid injection under local anesthetic using 

sterile prep and drape at the L3-4 level using C-arm fluoroscopic guidance in 

both AP and lateral views medications injected is 120 mg Depo-Medrol +10mL 

preservative-free normal saline and 2 mL contrast- condition at discharge is 

stable patient tolerated procedure well had no complications.





Condition at Discharge:


Condition at Discharge:


Condition at discharge stable, patient alert procedure well had no 

complications.











XIANG BARON MD               Jun 16, 2021 09:47

## 2022-05-19 NOTE — [facility]
EXAM: Cervical spine MRI without contrast.



HISTORY: Radiculopathy.



TECHNIQUE: Multiplanar, multisequence magnetic resonance imaging of the cervical spine was performed 
without contrast.



COMPARISON: 4/27/2016.



FINDINGS: There is instrumented anterior spinal fusion and interbody fusion at C6-C7. There is multil
evel endplate remodeling. There is no suspicious osseous lesion. There is no acute or subacute fractu
re. There is deformation of the spinal cord at multiple levels due to central canal stenosis, describ
ed in detail below. No spinal cord edema or myelomalacia is seen.



At C2-C3, there is a right lateral recess to extra foraminal disc protrusion and osteophyte complex s
uperimposed on endplate remodeling. There is mild bilateral facet arthropathy. There is uncovertebral
 arthropathy. There is moderate right foraminal stenosis.



At C3-C4, there is a disc bulge and endplate osteophytosis. There is moderate to severe right greater
 than left facet arthropathy. There is right uncovertebral arthropathy. There is severe right and mod
erate left foraminal stenosis. There is mild central canal stenosis measuring 8.4 mm in anterior post
erior dimension.



At C4-C5, there is a disc bulge and endplate osteophytosis. There is moderate bilateral facet arthrop
athy. There is right uncovertebral arthropathy. There is severe right and moderate left foraminal reyna
nosis. There is deformation of the spinal cord and moderate central canal stenosis measuring 7.3 mm i
n anterior posterior dimension.



At C5-C6, there is a disc bulge and endplate osteophytosis. There is moderate bilateral facet arthrop
athy. There is bilateral uncovertebral arthropathy. There is severe bilateral foraminal stenosis. The
re is mild central canal stenosis measuring 8.1 mm in anterior posterior dimension.



At C6-C7, there is instrumented fusion. There is left posterior lateral predominant endplate remodeli
ng. There is mild bilateral facet arthropathy. There is left uncovertebral arthropathy. There is mode
rate left foraminal stenosis. There is mild central canal stenosis measuring 8.6 mm in anterior poste
rior dimension.



At C7-T1, there is a left lateral recess to foraminal disc protrusion superimposed on a disc bulge an
d endplate osteophytosis. There is moderate left greater than right facet arthropathy. There is left 
greater than right uncovertebral arthropathy. There is moderate right and severe left foraminal steno
sis. There is slight deformation of the spinal cord and mild central canal stenosis measuring 9.1 mm 
in anterior posterior dimension.



IMPRESSION: 

1. Multilevel degenerative change involving the cervical spine, described in detail above. This resul
ts in significant stenosis at the aforementioned levels. These changes are very similar compared to t
he prior exam, allowing for differences in imaging technique.

2. Deformation of the spinal cord at multiple level due to central canal stenosis. No spinal cord heladio
ma or myelomalacia is seen.

3. Instrumented fusion at C6-C7.



Electronically signed by: Ly Carlos MD (5/19/2022 11:35 AM) [facility]

## 2022-05-19 NOTE — [facility]
EXAM: Lumbar spine MRI without contrast.



HISTORY: Spinal stenosis.



TECHNIQUE: Multiplanar, multisequence magnetic resonance imaging of the lumbar spine was performed wi
thout contrast.



COMPARISON: 5/17/2021



FINDINGS: There is instrumented left posterior spinal fusion and laminectomy decompression at L4-L5. 
There is mild lumbar scoliosis. There is minimal retrolisthesis of L2 on L3. There is multilevel endp
late remodeling with disc space narrowing and osteophytosis. This is most severe along the right aspe
ct of L3-L4. There are multiple endplate Schmorl's nodes. There is edema within the right L4 pedicle,
 likely degenerative or due to a stress reaction. There is no acute or subacute fracture. The conus t
erminates at L1. There is a simple left renal cyst. Follow-up is not routinely performed for simple c
ysts.



At L1-L2, there is and endplate remodeling. There is minimal central canal stenosis.



At L2-L3, there is a broad-based left lateral recess to extra foraminal disc protrusion and osteophyt
e complex and broad-based right foraminal to extra foraminal disc protrusion and osteophyte complex. 
These are superimposed on a disc bulge and left lateral predominant endplate osteophytosis. There is 
mild retrolisthesis. There is severe right and mild left facet arthropathy. There is mild-to-moderate
 bilateral foraminal stenosis. There is mild to moderate central canal stenosis and crowding of the n
erve roots despite partial laminectomy changes.



At L3-L4, there are bilateral posterior lateral disc osteophyte complexes and slight superior disc ex
trusions superimposed on a right lateral predominant disc bulge and endplate osteophytosis. There is 
mild bilateral facet arthropathy. There are laminectomy changes. There is severe left greater than ri
ght foraminal stenosis with effacement of the exiting left greater than right L3 nerve roots. There i
s moderate central canal stenosis and crowding of the nerve roots despite laminectomy changes.



At L4-L5, there is instrumented left posterior spinal fusion and laminotomy decompression. There is a
 bridging left posterior lateral endplate osteophyte superimposed on endplate osteophytosis. There is
 mild bilateral facet arthropathy. There is mild right and severe left foraminal stenosis. There is l
aminectomy decompression of the thecal sac. There is clumping of the nerve roots due to the sequela o
f arachnoiditis.



At L5-S1, there are bilateral foraminal to extra foraminal disc protrusions superimposed on a disc bu
lge and endplate remodeling. There is moderate bilateral facet arthropathy. There is severe bilateral
 foraminal stenosis with effacement of the exiting L5 nerve roots. There is clumping and peripheral d
eviation of the nerve roots due to the sequela of arachnoiditis.



IMPRESSION: 

1. Instrumented left posterior spinal fusion at L4-L5 and laminectomy changes at L2-L3, L3-L4 and L4-
L5.

2. Multilevel degenerative change involving the lumbar spine, described in detail above. This is asso
ciated with significant foraminal and central canal stenosis of aforementioned levels. The central ca
nal stenosis is most significant at L3-L4. The right foraminal stenosis is most significant at L3-L4 
and L5-S1. The left foraminal stenosis is most significant at L3-L4, L4-L5 and L5-S1. These findings 
are not significantly changed compared to the prior exam.

3. Degenerative endplate irregularity primarily along the right aspect of L3-L4. The degree of adjace
nt marrow edema does not favor superimposed discitis/osteomyelitis. There is also edema within the ri
ght L4 pedicle which is degenerative or due to a stress reaction.

4. Clumping and peripheral deviation of the nerve roots at L4-L5 and L5-S1, stable compared to the pr
ior exam and consistent with prior arachnoiditis.



Electronically signed by: Ly Carlos MD (5/19/2022 12:25 PM) [facility]

## 2023-09-15 NOTE — [facility]
MR LUMBAR SPINE WO [id number] 



Date: 5/17/2021 1:15 PM 



Indication:  LUMBOSACRAL RADICULOPATHY. Discectomy after last MR at [facility]. Multiple lumbar surgeries.  
Low back pain since February. New right leg numbness since last surgery. 



Comparison:  MRI 1/24/2020. 



Technique: Multi-planar multi-weighted magnetic resonance imaging of the lumbar spine was performed w
ithout intravenous contrast using the standard lumbar spine protocol.



FINDINGS:



Postsurgical changes of posterior instrumentation at L4-5 on the left.



Straightening of the lumbar lordosis. No acute fracture. Moderate multilevel degenerative disc desicc
ation and disc height loss. Degenerative endplate edema at L2-3 on the left and L3-4 on the right.



The conus terminates at a normal level. No abnormal signal is seen within the visualized distal spina
l cord. Redundancy of the nerve roots of the cauda equina proximal to L3-4.



Left renal cyst.



T12-L1: No disc bulge. No facet arthropathy. No significant spinal stenosis or neural foraminal narro
wing. 



L1-L2: Disc bulge. Mild facet arthropathy. No significant spinal stenosis or neural foraminal narrowi
ng



L2-L3: Posterior decompression. Previously seen inferiorly migrating central protrusion has significa
ntly improved. No spinal canal stenosis. Moderate bilateral lateral recess narrowing. Moderate bilate
ral neural foraminal narrowing. Moderate right and mild left facet arthropathy.



L3-L4: Moderate facet arthropathy. Increased disc bulge. Moderate to severe spinal canal stenosis, pr
ogressed from the prior. Severe bilateral neural foraminal, progressed from the prior.



L4-L5: Posterior decompression. No spinal canal stenosis. Mild to moderate neuroforaminal narrowing. 




L5-S1: Disc bulge. Mild facet arthropathy. No significant spinal stenosis. Severe right and moderate 
left neural foraminal narrowing. 



IMPRESSION:



Moderate to severe spinal canal stenosis at L3-4 has progressed from the prior exam. Degenerative steve
nges at the remaining levels as above.



Electronically signed by: Pool Cooney MD (5/17/2021 3:55 PM) [id number] Consent (Temporal Branch)/Introductory Paragraph: The rationale for Mohs was explained to the patient and consent was obtained. The risks, benefits and alternatives to therapy were discussed in detail. Specifically, the risks of damage to the temporal branch of the facial nerve, infection, scarring, bleeding, prolonged wound healing, incomplete removal, allergy to anesthesia, and recurrence were addressed. Prior to the procedure, the treatment site was clearly identified and confirmed by the patient. All components of Universal Protocol/PAUSE Rule completed.